# Patient Record
Sex: FEMALE | Race: ASIAN | NOT HISPANIC OR LATINO | Employment: UNEMPLOYED | URBAN - METROPOLITAN AREA
[De-identification: names, ages, dates, MRNs, and addresses within clinical notes are randomized per-mention and may not be internally consistent; named-entity substitution may affect disease eponyms.]

---

## 2017-02-24 ENCOUNTER — OFFICE VISIT - HEALTHEAST (OUTPATIENT)
Dept: FAMILY MEDICINE | Facility: CLINIC | Age: 27
End: 2017-02-24

## 2017-02-24 DIAGNOSIS — L30.9 ECZEMA: ICD-10-CM

## 2017-02-24 ASSESSMENT — MIFFLIN-ST. JEOR: SCORE: 1135.82

## 2017-05-17 ENCOUNTER — COMMUNICATION - HEALTHEAST (OUTPATIENT)
Dept: FAMILY MEDICINE | Facility: CLINIC | Age: 27
End: 2017-05-17

## 2017-12-08 ENCOUNTER — OFFICE VISIT - HEALTHEAST (OUTPATIENT)
Dept: FAMILY MEDICINE | Facility: CLINIC | Age: 27
End: 2017-12-08

## 2017-12-08 DIAGNOSIS — Z34.90 PREGNANCY: ICD-10-CM

## 2017-12-08 ASSESSMENT — MIFFLIN-ST. JEOR: SCORE: 1158.5

## 2017-12-15 ENCOUNTER — HOSPITAL ENCOUNTER (OUTPATIENT)
Dept: ULTRASOUND IMAGING | Facility: HOSPITAL | Age: 27
Discharge: HOME | End: 2017-12-15
Attending: FAMILY MEDICINE

## 2017-12-15 DIAGNOSIS — Z34.90 PREGNANCY: ICD-10-CM

## 2017-12-22 ENCOUNTER — PRENATAL OFFICE VISIT - HEALTHEAST (OUTPATIENT)
Dept: FAMILY MEDICINE | Facility: CLINIC | Age: 27
End: 2017-12-22

## 2017-12-22 DIAGNOSIS — Z34.02 PRIMIGRAVIDA IN SECOND TRIMESTER: ICD-10-CM

## 2017-12-22 DIAGNOSIS — Z12.4 PAP SMEAR FOR CERVICAL CANCER SCREENING: ICD-10-CM

## 2017-12-22 LAB — HIV 1+2 AB+HIV1 P24 AG SERPL QL IA: NEGATIVE

## 2017-12-22 ASSESSMENT — MIFFLIN-ST. JEOR: SCORE: 1181.18

## 2017-12-23 LAB
HBV SURFACE AG SERPL QL IA: NEGATIVE
SYPHILIS RPR SCREEN - HISTORICAL: NORMAL

## 2017-12-27 ENCOUNTER — COMMUNICATION - HEALTHEAST (OUTPATIENT)
Dept: FAMILY MEDICINE | Facility: CLINIC | Age: 27
End: 2017-12-27

## 2017-12-27 LAB
BKR LAB AP ABNORMAL BLEEDING: NO
BKR LAB AP BIRTH CONTROL/HORMONES: NORMAL
BKR LAB AP CERVICAL APPEARANCE: YES
BKR LAB AP GYN ADEQUACY: NORMAL
BKR LAB AP GYN INTERPRETATION: NORMAL
BKR LAB AP HPV REFLEX: NORMAL
BKR LAB AP LMP: NORMAL
BKR LAB AP PATIENT STATUS: NORMAL
BKR LAB AP PREVIOUS ABNORMAL: NORMAL
BKR LAB AP PREVIOUS NORMAL: NORMAL
HIGH RISK?: NO
PATH REPORT.COMMENTS IMP SPEC: NORMAL
RESULT FLAG (HE HISTORICAL CONVERSION): NORMAL

## 2017-12-28 ENCOUNTER — COMMUNICATION - HEALTHEAST (OUTPATIENT)
Dept: SCHEDULING | Facility: CLINIC | Age: 27
End: 2017-12-28

## 2017-12-28 ENCOUNTER — AMBULATORY - HEALTHEAST (OUTPATIENT)
Dept: NURSING | Facility: CLINIC | Age: 27
End: 2017-12-28

## 2017-12-28 DIAGNOSIS — O98.219 GONORRHEA AFFECTING PREGNANCY: ICD-10-CM

## 2019-10-04 ENCOUNTER — OFFICE VISIT - HEALTHEAST (OUTPATIENT)
Dept: FAMILY MEDICINE | Facility: CLINIC | Age: 29
End: 2019-10-04

## 2019-10-04 DIAGNOSIS — Q67.5 CONGENITAL MUSCULOSKELETAL DEFORMITY OF SPINE: ICD-10-CM

## 2019-10-04 ASSESSMENT — MIFFLIN-ST. JEOR: SCORE: 1215.48

## 2019-10-04 ASSESSMENT — PATIENT HEALTH QUESTIONNAIRE - PHQ9: SUM OF ALL RESPONSES TO PHQ QUESTIONS 1-9: 0

## 2019-10-11 ENCOUNTER — RECORDS - HEALTHEAST (OUTPATIENT)
Dept: ADMINISTRATIVE | Facility: OTHER | Age: 29
End: 2019-10-11

## 2019-12-16 ENCOUNTER — COMMUNICATION - HEALTHEAST (OUTPATIENT)
Dept: SCHEDULING | Facility: CLINIC | Age: 29
End: 2019-12-16

## 2019-12-18 ENCOUNTER — OFFICE VISIT - HEALTHEAST (OUTPATIENT)
Dept: FAMILY MEDICINE | Facility: CLINIC | Age: 29
End: 2019-12-18

## 2019-12-18 DIAGNOSIS — N91.2 AMENORRHEA: ICD-10-CM

## 2019-12-18 DIAGNOSIS — Z86.19 HISTORY OF GONORRHEA: ICD-10-CM

## 2019-12-18 DIAGNOSIS — Z34.90 PREGNANCY, UNSPECIFIED GESTATIONAL AGE: ICD-10-CM

## 2019-12-18 LAB — HCG UR QL: POSITIVE

## 2019-12-18 ASSESSMENT — MIFFLIN-ST. JEOR: SCORE: 1210.09

## 2019-12-20 LAB
C TRACH DNA SPEC QL PROBE+SIG AMP: NEGATIVE
N GONORRHOEA DNA SPEC QL NAA+PROBE: NEGATIVE

## 2020-03-04 ENCOUNTER — COMMUNICATION - HEALTHEAST (OUTPATIENT)
Dept: FAMILY MEDICINE | Facility: CLINIC | Age: 30
End: 2020-03-04

## 2020-04-01 ENCOUNTER — OFFICE VISIT - HEALTHEAST (OUTPATIENT)
Dept: FAMILY MEDICINE | Facility: CLINIC | Age: 30
End: 2020-04-01

## 2020-04-01 DIAGNOSIS — Z30.09 BIRTH CONTROL COUNSELING: ICD-10-CM

## 2020-04-01 DIAGNOSIS — F32.3 MAJOR DEPRESSIVE DISORDER, SINGLE EPISODE, SEVERE WITH PSYCHOTIC FEATURES (H): ICD-10-CM

## 2020-04-01 LAB — HCG UR QL: NEGATIVE

## 2020-04-01 RX ORDER — NORGESTIMATE AND ETHINYL ESTRADIOL 0.25-0.035
1 KIT ORAL DAILY
Qty: 3 PACKAGE | Refills: 3 | Status: SHIPPED | OUTPATIENT
Start: 2020-04-01 | End: 2023-08-03

## 2020-04-01 ASSESSMENT — MIFFLIN-ST. JEOR: SCORE: 1186.28

## 2020-04-01 ASSESSMENT — PATIENT HEALTH QUESTIONNAIRE - PHQ9: SUM OF ALL RESPONSES TO PHQ QUESTIONS 1-9: 0

## 2020-04-09 ENCOUNTER — OFFICE VISIT - HEALTHEAST (OUTPATIENT)
Dept: FAMILY MEDICINE | Facility: CLINIC | Age: 30
End: 2020-04-09

## 2020-04-09 DIAGNOSIS — Z30.016 ENCOUNTER FOR INITIAL PRESCRIPTION OF TRANSDERMAL PATCH HORMONAL CONTRACEPTIVE DEVICE: ICD-10-CM

## 2021-04-21 ENCOUNTER — COMMUNICATION - HEALTHEAST (OUTPATIENT)
Dept: FAMILY MEDICINE | Facility: CLINIC | Age: 31
End: 2021-04-21

## 2021-04-21 DIAGNOSIS — Z30.016 ENCOUNTER FOR INITIAL PRESCRIPTION OF TRANSDERMAL PATCH HORMONAL CONTRACEPTIVE DEVICE: ICD-10-CM

## 2021-05-26 ASSESSMENT — PATIENT HEALTH QUESTIONNAIRE - PHQ9
SUM OF ALL RESPONSES TO PHQ QUESTIONS 1-9: 0
SUM OF ALL RESPONSES TO PHQ QUESTIONS 1-9: 0

## 2021-05-30 VITALS — WEIGHT: 114 LBS | BODY MASS INDEX: 23.93 KG/M2 | HEIGHT: 58 IN

## 2021-05-31 VITALS — WEIGHT: 119 LBS | BODY MASS INDEX: 24.98 KG/M2 | HEIGHT: 58 IN

## 2021-05-31 VITALS — BODY MASS INDEX: 26.03 KG/M2 | HEIGHT: 58 IN | WEIGHT: 124 LBS

## 2021-06-02 NOTE — PATIENT INSTRUCTIONS - HE
Referral given for Orthotics/Prosthetics evaluation at [facility]. Please set this up with specialty scheduling.

## 2021-06-02 NOTE — PROGRESS NOTES
"ASSESSMENT AND PLAN:  1. Congenital Spinal Deformity  Velcro is frayed 2 spots on the brace she will need to be evaluated again for a new brace this point appears to be quite old.  - Ambulatory referral for Orthotics / Prosthetics - Madhuri            Orders Placed This Encounter   Procedures     Ambulatory referral for Orthotics / Prosthetics - Madhuri     Referral Priority:   Routine     Referral Type:   Orthotics     Referral Reason:   Evaluation and Treatment     Referral Location:   [facility] ORTHOTICS PROSTHETICS     Requested Specialty:   Orthotics     Number of Visits Requested:   1     There are no discontinued medications.    No follow-ups on file.    CHIEF COMPLAINT:  Chief Complaint   Patient presents with     Broken brace       HISTORY OF PRESENT ILLNESS:  Starr is a 28 y.o. female with congenital spinal deformity, progressive idiopathic adolescent scoliosis, status post T2-L3 spinal fusion complicated by incomplete spinal cord injury, status post T7-T8 resection, major depression, vitamin D deficiency, and history of malaria, who presents to the clinic today for a new leg brace. Starr is present with a Tabby . She previously followed with Dr. Pascal and then at [facility] for her pregnancies. The patient has worn a left ankle brace for 4 years, and now the Velcro has worn off.     REVIEW OF SYSTEMS:   Extremities: Left leg ankle brace has worn down.   All other systems are negative.    PFSH:  She lives in [place]. Reviewed as below.     TOBACCO USE:  Social History     Tobacco Use   Smoking Status Never Smoker   Smokeless Tobacco Never Used       VITALS:  Vitals:    10/04/19 1336   BP: 90/64   Pulse: 76   Resp: 18   Temp: 98.3  F (36.8  C)   TempSrc: Oral   Weight: 127 lb 3 oz (57.7 kg)   Height: 4' 11.5\" (1.511 m)     Wt Readings from Last 3 Encounters:   10/04/19 127 lb 3 oz (57.7 kg)   12/22/17 124 lb (56.2 kg)   12/08/17 119 lb (54 kg)     Body mass index is 25.26 kg/m .      PHYSICAL " EXAM:  General: Alert, cooperative, no distress, appears stated age  Head: Normocephalic, without obvious abnormality, atraumatic  Eyes: PERRL, conjunctiva/cornea clear, EOM's intact  Extremities: Wearing down of left leg brace at the lateral malleoulus and just below the left knee  Neurologic:  A & O x 3.  No tremor, no focal findings.      DATA REVIEWED:  Additional History from Old Records Summarized (2): Reviewed Dr. Dick's 12/22/2017 note regarding new OB visit and past medical history.  Decision to Obtain Records (1): none  Radiology Tests Summarized or Ordered (1): none  Labs Reviewed or Ordered (1): none  Medicine Test Summarized or Ordered (1): none  Independent Review of EKG or X-RAY(2 each): none      Rain MARTINEZ, am scribing for and in the presence of, Dr. Parikh.    I, Dr. Parikh, personally performed the services described in this documentation, as scribed by Rain Massey in my presence, and it is both accurate and complete.      MEDICATIONS:  Current Outpatient Medications   Medication Sig Dispense Refill     ferrous sulfate 325 (65 FE) MG tablet Take 1 tablet (325 mg total) by mouth daily. 30 tablet 6     triamcinolone (KENALOG) 0.1 % ointment Apply topically 2 (two) times a day. 30 g 0     No current facility-administered medications for this visit.        Total Data Points: 2    Please note that this clinical encounter uses voice recognition software, there may be typographical errors present

## 2021-06-03 VITALS
WEIGHT: 127.19 LBS | BODY MASS INDEX: 24.97 KG/M2 | HEIGHT: 60 IN | DIASTOLIC BLOOD PRESSURE: 64 MMHG | HEART RATE: 76 BPM | RESPIRATION RATE: 18 BRPM | SYSTOLIC BLOOD PRESSURE: 90 MMHG | TEMPERATURE: 98.3 F

## 2021-06-03 VITALS
SYSTOLIC BLOOD PRESSURE: 108 MMHG | TEMPERATURE: 98.5 F | DIASTOLIC BLOOD PRESSURE: 74 MMHG | BODY MASS INDEX: 24.74 KG/M2 | OXYGEN SATURATION: 99 % | WEIGHT: 126 LBS | HEIGHT: 60 IN | RESPIRATION RATE: 16 BRPM | HEART RATE: 82 BPM

## 2021-06-04 VITALS
RESPIRATION RATE: 16 BRPM | TEMPERATURE: 97.9 F | HEART RATE: 83 BPM | DIASTOLIC BLOOD PRESSURE: 68 MMHG | WEIGHT: 120.75 LBS | SYSTOLIC BLOOD PRESSURE: 102 MMHG | OXYGEN SATURATION: 98 % | HEIGHT: 60 IN | BODY MASS INDEX: 23.71 KG/M2

## 2021-06-04 NOTE — TELEPHONE ENCOUNTER
New Appointment Needed  What is the reason for the visit:    Pregnancy Confirmation Appt Needed  When was the first day of your last menstrual cycle?: 11/05  Have you done a home pregnancy test?: Yes: 3.    Provider Preference: Any available  How soon do you need to be seen?: as soon as able.   Waitlist offered?: No  Okay to leave a detailed message:  Yes

## 2021-06-04 NOTE — PROGRESS NOTES
"SUBJECTIVE  Winancy Sommers is a 29 y.o. female here for:    Has 1 son- born [facility] in [facility] via normal vaginal delivery.   Of note- she has history of severe scoliosis s/p surgical correction.  LMP 19. ARLETH 8, making her 6w4d today.  [facility] and she was reportedly 8 weeks along  She desires termination of pregnancy  She feels unhealthy and worries about her medical conditions, scoliosis, pain.   She has not told anyone about the pregnancy  Denies any safety concerns  She lives with her grandparents who are supportive    ROS  Complete 10 point review of systems negative except as noted above in HPI    Reviewed Past Medical History, Medications, Family History and Social History in Epic and up to date with no new changes.    OBJECTIVE  /74   Pulse 82   Temp 98.5  F (36.9  C) (Oral)   Resp 16   Ht 4' 11.5\" (1.511 m)   Wt 126 lb (57.2 kg)   LMP 2019   SpO2 99%   Breastfeeding No   BMI 25.02 kg/m       General: Cooperative, pleasant, in no acute distress  HEENT: NCAT, sclera clear  Resp: No respiratory distress.   Psych: Appropriately tearful, flat affect, denies SI    LABS & IMAGES   Results for orders placed or performed in visit on 19   Pregnancy, Urine   Result Value Ref Range    Pregnancy Test, Urine Positive (!) Negative         ASSESSMENT/PLAN:   Starr was seen today for confirmation.    Diagnoses and all orders for this visit:    Pregnancy, unspecified gestational age:  at 8 weeks based on US yesterday at outside clinic- she desires termination of pregnancy. Discussed all options and offered support. She has history of depression- not on medications recently and she has very flat affect today. Denies SI. She lives with her grandparents. Discussed termination procedure- patient was walked down to specialty  who will assist with setting up appointment. I will follow-up with patient and her son's next clinic visit- she " declined scheduling follow-up for herself at this time.  -     Pregnancy, Urine    History of gonorrhea: Treated and had test of cure. Will recheck today- asymptomatic screening.  -     Chlamydia trachomatis & Neisseria gonorrhoeae, Amplified Detection      Spent 35 min face to face with patient with more the 50% spent in counseling, reviewing chart, and coordination of care and discussing problems listed above.       Visit was completed along with Tabby     Options for treatment and follow-up care were reviewed with the patient. Wieber Thoo and/or guardian was engaged and actively involved in the decision making process. Wieber Thoo and/or guardian verbalized understanding of the options discussed and was satisfied with the final plan.      Shivani Dick MD

## 2021-06-04 NOTE — TELEPHONE ENCOUNTER
Who is calling:  Patient  Reason for Call:  Returning call   Date of last appointment with primary care:   Okay to leave a detailed message: Yes

## 2021-06-06 NOTE — TELEPHONE ENCOUNTER
CHW outreached to the Billing Department regarding patient's outstanding balance. December 2019 patient had active straight Medical Assistance. Vanita from the Billing Department updated the insurance for the claim and states the patient can disregard the bill.      PMI#: [id number]     Major Programs  This subscriber has eligibility for MA: Medical Assistance.  Elig Type AA: Parent of a dependent child  Eligibility Begin Date: 05/01/2019  Eligibility End Date: --/--/----  This subscriber is eligible for the following service types: Medical Care ,  Chiropractic ,  Dental Care ,  Hospital ,  Hospital - Inpatient ,  Hospital - Outpatient ,  Emergency Services ,  Pharmacy ,  Professional (Physician) Visit - Office ,  Vision (Optometry) ,  Mental Health ,  Urgent Care  Prepaid Health Plan  None      CHW followed up with Starr and informed her she can disregard the bill. Starr reports she has no other concerns.

## 2021-06-06 NOTE — TELEPHONE ENCOUNTER
Pt would like an appt with the SW for help asking insurance to back-date for appt on 12/18/19 (ins not active that month).

## 2021-06-07 NOTE — PROGRESS NOTES
"No problem-specific Assessment & Plan notes found for this encounter.    Starr Sommers is a 29 y.o. female who is being evaluated via a billable telephone visit.      The patient has been notified of following:     \"This telephone visit will be conducted via a call between you and your physician/provider. We have found that certain health care needs can be provided without the need for a physical exam.  This service lets us provide the care you need with a short phone conversation.  If a prescription is necessary we can send it directly to your pharmacy.  If lab work is needed we can place an order for that and you can then stop by our lab to have the test done at a later time.    Telephone visits are billed at different rates depending on your insurance coverage. During this emergency period, for some insurers they may be billed the same as an in-person visit.  Please reach out to your insurance provider with any questions.    If during the course of the call the physician/provider feels a telephone visit is not appropriate, you will not be charged for this service.\"    Patient has given verbal consent to a Telephone visit? Yes    Patient would like to receive their AVS by AVS Preference: Mail a copy.    Starr Sommers complains of    Chief Complaint   Patient presents with     Contraception     Patient is currently taking birth control pill and wouldl to discuss the Nexplanon.        I have reviewed and updated the patient's Past Medical History, Social History, Family History and Medication List.    ALLERGIES  Patient has no known allergies.    Additional provider notes: insert own note template here     No tobacco   No blood Clots  No Liver issues      Assessment/Plan:  1. Encounter for initial prescription of transdermal patch hormonal contraceptive device  Patient desires birth control  Because of covid-19 pandemic  Putting off all nonessential procedures  She is interested down the line and possibly having a " Nexplanon procedure  For the time being she will use Ortho Evra equivalent  She will use 1 patch weekly consecutive for 3 weeks then on the fourth week she will remove the patch and keep the soft during this week she was encouraged to start this 2 days after her next cycle  She was encouraged to call for starting the prescription so she can get specific instructions on which days to put the patch on and take the patch off      - norelgestromin-ethinyl estradiol (ORTHO EVRA) 150-35 mcg/24 hr; Place 1 patch on the skin every 7 days. For 3 consecutive weeks then remove and do not replace patch for 1 week then restart cycle  Dispense: 9 patch; Refill: 3        Phone call duration:   28  Minutes   10:07 AM to 10:35 AM     Gurvinder Atkinson MD

## 2021-06-07 NOTE — PROGRESS NOTES
"Starr Sommers is a 29 y.o. female here for birth control counseling    ASSESSMENT/PLAN:   Starr was seen today for contraception.    Diagnoses and all orders for this visit:    Birth control counseling  Discussed all options for birth control with patient, she wanted OCPs. Pregnancy test negative, has not had sexual intercourse in 2 weeks.   -     Pregnancy, Urine  -     norgestimate-ethinyl estradioL (ORTHO-CYCLEN) 0.25-35 mg-mcg per tablet; Take 1 tablet by mouth daily.  -  Use back up contraception for first week      Major Depression, Single Episode With Psychotic Features  -     PHQ9 Depression Screen, score 0        Return in about 1 month (around 5/1/2020) for birth control only if she has side effects.   RTC for annual physical when able    ======================================================    SUBJECTIVE  Starr Sommers is a 29 y.o. female here for birth control.     She is interested in the pill.   She has been on Depakote in the past, had irregular periods and fatigue and headaches.    Discussed all her options including IUD, Nexplanon, OCPs, patch, NuvaRing, condoms.  Patient still wants to try OCPs.  She is a non-smoker, not around anyone who smokes, no personal or family history of blood clots or bleeding disorders.    Discussed with patient reasons to call clinic, including side effects.  She needs to take the pill at the same time every single day, if she is unable to do that then she should call clinic for alternative forms of birth control.      ROS  Complete 10 point review of systems negative except as noted above in HPI    Reviewed Past Medical History, Medications, Family History and Social History in Epic and up to date with no new changes.    OBJECTIVE  /68   Pulse 83   Temp 97.9  F (36.6  C) (Oral)   Resp 16   Ht 4' 11.5\" (1.511 m)   Wt 120 lb 12 oz (54.8 kg)   LMP 03/28/2020 (Within Days)   SpO2 98%   BMI 23.98 kg/m       General: Cooperative, pleasant, in no acute " distress  HEENT: PERRL, EOMI.   Resp: No respiratory distress.   Skin: warm, well perfused.   Psych: No suicidal or homicidal ideations, no self-harm.  Normal affect.    LABS & IMAGES   Results for orders placed or performed in visit on 04/01/20   Pregnancy, Urine   Result Value Ref Range    Pregnancy Test, Urine Negative Negative         ======================================================    Visit was completed along with in person Tabby     Options for treatment and follow-up care were reviewed with the patient. Wieber Thoo and/or guardian was engaged and actively involved in the decision making process. Wieber Thoo and/or guardian verbalized understanding of the options discussed and was satisfied with the final plan.      Isabel Mccall MD

## 2021-06-08 ENCOUNTER — RECORDS - HEALTHEAST (OUTPATIENT)
Dept: ADMINISTRATIVE | Facility: OTHER | Age: 31
End: 2021-06-08

## 2021-06-08 DIAGNOSIS — Z30.016 ENCOUNTER FOR INITIAL PRESCRIPTION OF TRANSDERMAL PATCH HORMONAL CONTRACEPTIVE DEVICE: ICD-10-CM

## 2021-06-08 RX ORDER — NORELGESTROMIN AND ETHINYL ESTRADIOL 150; 35 UG/D; UG/D
PATCH TRANSDERMAL
Qty: 3 PATCH | Refills: 3 | Status: SHIPPED | OUTPATIENT
Start: 2021-06-08 | End: 2021-10-19

## 2021-06-09 NOTE — PROGRESS NOTES
"  Chief Complaint   Patient presents with     Rash     bilateral arms         HPI:   Starr Sommers is a 26 y.o. female with  c/o itchy rash for about the last month..  Located on both elbows and right index finger.  Started on left elbow  No medication tried.  No drainage.  No one else at home has a rash  No medication.  Arrived US 2007.    Also requests help scheduling to get her leg brace repaired    ROS:  A 12 point comprehensive review of systems was negative except as noted.     Medications:  No current outpatient prescriptions on file prior to visit.     No current facility-administered medications on file prior to visit.          Social History:  Social History   Substance Use Topics     Smoking status: Never Smoker     Smokeless tobacco: Never Used     Alcohol use No         Physical Exam:   Vitals:    02/24/17 1025   BP: 112/68   Patient Site: Right Arm   Patient Position: Sitting   Cuff Size: Adult Regular   Pulse: 72   Resp: 16   Temp: 98  F (36.7  C)   TempSrc: Oral   Weight: 114 lb (51.7 kg)   Height: 4' 10.25\" (1.48 m)       GENERAL:   Alert. Oriented.  EYES: Clear  HENT:  Ears: R TM pearly gray. Normal landmarks. L TM pearly gray.  Normal landmarks  Nose: Clear.  Sinuses: Nontender.  Oropharynx:  No erythema. No exudate.  NECK: Supple. No adenopathy.  LUNGS: Clear to ascultation.  No crackles.  No wheezing  HEART: RRR  SKIN:  Papules and plaques, with slight erythema and scaling on elbows and side of right index finger.        Assessment/Plan:    1. Eczema  triamcinolone (KENALOG) 0.1 % ointment    DISCONTINUED: triamcinolone (KENALOG) 0.1 % ointment      Good moisturizing lotion.  Recheck if not improving.    Sent to specialty to assist in scheduling repair      The following portions of the patient's history were reviewed and updated as appropriate: allergies, current medications, past family history, past medical history, past social history, past surgical history and problem " list.    Jenifer Aguilar MD      2/24/2017

## 2021-06-14 NOTE — PROGRESS NOTES
New OB Clinic Note - 2017   Visit was completed along with Tabby .   SUBJECTIVE:  Starr Sommers is a 27 y.o.  female @ 17w6d who is here for new OB visit.   Dates based on LMP >10 days different from ultrasound, so will use dating based on Ultrasound at 16 weeks gave EDC of 18  She is happy about the pregnancy.     Current Concerns: none  She is moving to [place], [place] next week to live with family. Her grandparents, who she lives with here, will be joining her.   She has had not had nausea and vomiting.   She denies bleeding, cramping. No loss of fluid. She denies HA.   Denies dysuria or hematuria.   Denies constipation.  OB History:  Patient is . Prior Pregnancies:  She does not have a history of  birth before 37 weeks with a prater gestation.  She does not have a history of preeclampsia in prior pregnancy.   She does not have a history of recurrent (>2) pregnancy losses.  She does not have a history of Down's syndrome, sickle cell anemia or other genetic disorder affecting a child in her family.  She does have a history of major depression or postpartum depression- reviewed chart notes. She denies today.  She does not have a history of STI's including Chlamydia, gonorrhea, Hep B virus, syphilis, HPV, or genital herpes.   Social Hx:   She has good support from her family (lives with grandparents) and father of baby Mayank (does not know about pregnancy)   She stays at home.  She has not used tobacco, has not used EtOH and has not used illicit drugs.  Current Medications: prenatal vitamins, does not need refill  Medical History:  Progressive idiopathic adolescent scoliosis  Hx of depression    Surgical History:  Spine surgery 2015: T2-L3 spinal fusion for scoliosis complicated by incomplete spinal cord injury, now resolved. T7-T8 resection.    Family History   Problem Relation Age of Onset     No Medical Problems Mother      No Medical Problems Father      Medications: prenatal  "vitamin  OBJECTIVE:  Vitals:    17 1006   BP: 98/60   Patient Site: Right Arm   Patient Position: Sitting   Cuff Size: Adult Regular   Pulse: 96   Resp: 16   Temp: 98.1  F (36.7  C)   TempSrc: Oral   Weight: 124 lb (56.2 kg)   Height: 4' 10.25\" (1.48 m)     Gen: Awake, alert, in no acute distress  HEENT: oral mucosa is moist and pink, dentition is adequate   Neck: Thyroid is non-enlarged, without nodules or tenderness  CV: RRR with normal S1 and S2. No murmurs appreciated.  Resp: Lungs are clear to auscultation bilaterally without crackles or wheezing.  Abd: non-tender, non-distended. Bowel sounds present.   Pelvic Exam: Vagina and vulva are normal; no discharge is noted. Cervix normal without lesions. Uterus anteverted and mobile, normal in size and shape without tenderness. Adnexa normal in size without masses or tenderness. She is due for a pap smear today.  Lower extremities: No edema  Psych: flat affect  Neuro: No focal deficits  's  Results for orders placed or performed in visit on 17   Urinalysis Macroscopic   Result Value Ref Range    Color, UA Yellow Colorless, Yellow, Straw, Light Yellow    Clarity, UA Clear Clear    Glucose, UA Negative Negative    Bilirubin, UA Negative Negative    Ketones, UA Negative Negative    Specific Gravity, UA 1.020 1.005 - 1.030    Blood, UA Negative Negative    pH, UA 7.0 5.0 - 8.0    Protein, UA Negative Negative mg/dL    Urobilinogen, UA 0.2 E.U./dL 0.2 E.U./dL, 1.0 E.U./dL    Nitrite, UA Negative Negative    Leukocytes, UA Trace (!) Negative     ASSESSMENT/PLAN:  Starr Sommers is a 27 y.o.  at 17w6d weeks by 16 week ultrasound. Estimated Date of Delivery: 18.   1. Discussed recommended weight gain, healthy eating habits, exercise, and warning signs for miscarriage and  labor (bleeding, cramping).   2. Continue prenatal vitamins.   3. Counseled on smoking cessation (if indicated) and abstinence from alcohol, opioids, benzodiazepines and " non-medically necessary medications  4. Pelvic exam including GC, Chlamydia and PAP (if >21yrs) was completed.  5. Prenatal labs completed - prenatal profile, UA/UC, HIV   6. Reviewed eligibility for low-dose aspirin therapy for prevention of preeclampsia (preeclampsia in prior pregnancy, pre-existing HTN or DM, or multiple other risk factors including  delivery, chronic HTN, DM, obesity, low socioeconomic status, non- ethnicity). Patient is not a candidate for this.   7. Reviewed eligibility for 17-hydroxyprogesterone therapy for prevention of  birth (prior prater delivery before 37 weeks). Patient is not a candidate for this.   8. Patient declined quad screening.  9. Hx of depression: I did have some concern about her overall mood and affect today. She expressed she is happy about pregnancy and denies any depression or mood concerns. She is currently not on any SSRI or other medications for mood and not seeing counselor. Will need to monitor closely throughout pregnancy.  10. Patient will be moving to [place], [place] next week. Gave her dating information/AVS today to bring with, and encouraged her to establish care within a few weeks.  Starr was seen today for initial prenatal visit.    Diagnoses and all orders for this visit:    Primigravida in second trimester  -     ABO/RH Typing (OP order)  -     Hepatitis B Surface antigen (HBsAG)  -     HIV Antigen/Antibody Screening Cascade  -     HM1(CBC and Differential)  -     HML Antibody Screen  -     RPR  -     Rubella Immune Status (IgG)  -     Urinalysis Macroscopic  -     Culture, Urine  -     Chlamydia/gonorrhoeae CERVIX  -     HM1 (CBC with Diff)    Pap smear for cervical cancer screening  -     Gynecologic Cytology (PAP Smear)    Shivani Dick MD    Options for treatment and follow-up care were reviewed with the patient and/or guardian. Starr Thoo and/or guardian engaged in the decision making process and verbalized understanding of the  options discussed and agreed with the final plan.

## 2021-06-14 NOTE — PROGRESS NOTES
"Chief Complaint   Patient presents with     Pregnancy Confirmation       HPI:  Starr Sommers is a 27 y.o. female  with   presents requesting pregnancy test.  Patient's last menstrual period was 10/01/2017 (exact date).   Normal: yes  Contraception: none  Home Pregnancy Test:  10/10/17--positive  No obstetric history on file.  Last Pregnancy: none  Symptoms: nausea since October    Tobacco use: none  ETOH use:  none    Planned: no    MEDICATIONS:  Current Outpatient Prescriptions on File Prior to Visit   Medication Sig     triamcinolone (KENALOG) 0.1 % ointment Apply topically 2 (two) times a day.     No current facility-administered medications on file prior to visit.          ALLERGIES:  No Known Allergies    SOCIAL HISTORY:  History   Smoking Status     Never Smoker   Smokeless Tobacco     Never Used         EXAM:  Vitals:    12/08/17 0825   BP: 98/64   Pulse: 64   Resp: 20   Temp: 98.3  F (36.8  C)   TempSrc: Oral   Weight: 119 lb (54 kg)   Height: 4' 10.25\" (1.48 m)       GEN:  NAD  ABD:  FUNDUS: midway to umbilicus    FHT's by moi: 160    LABS:  Results for orders placed or performed in visit on 12/08/17   Pregnancy, Urine   Result Value Ref Range    Pregnancy Test, Urine Positive (!) Negative       ASSESSMENT/PLAN:    1. Amenorrhea  Pregnancy, Urine      Dating by LMP:  EGA: 9+5 weeks    EDC: 7/8/17--not consistent with size    Start prenatal vitamins.  Healthy diet  Small, frequent meals if nauseated.  No medications other than tylenol unless okayed by doctor  Notify clinic if bleeding or pain.  Set up 1st OB visit.     U/s for dating.    Patient has some past history of spine problem with large scar on back.  I am unsure what that was.  Patient was not planning pregnancy, is not  and hasn't had contact with father of the baby for some time.  She denies any physical abuse.    The following portions of the patient's history were reviewed and updated as appropriate: allergies, current " medications, past family history, past medical history, past social history, past surgical history and problem list.         Jenifer Aguilar MD   12/8/2017

## 2021-06-20 NOTE — LETTER
Letter by Gurvinder Atkinson MD at      Author: Gurvinder Atkinson MD Service: -- Author Type: --    Filed:  Encounter Date: 4/9/2020 Status: (Other)                    My Depression Action Plan  Name: Starr Sommers   Date of Birth 1990  Date: 4/9/2020    My Doctor: Rossi Pascal MD   My Clinic: [facility]   [address] [place] [id number]  [phone]          GREEN    ZONE   Good Control    What it looks like:     Things are going generally well. You have normal ups and downs. You may even feel depressed from time to time, but bad moods usually last less than a day.   What you need to do:  1. Continue to care for yourself (see self care plan)  2. Check your depression survival kit and update it as needed  3. Follow your physicians recommendations including any medication.  4. Do not stop taking medication unless you consult with your physician first.           YELLOW         ZONE Getting Worse    What it looks like:     Depression is starting to interfere with your life.     It may be hard to get out of bed; you may be starting to isolate yourself from others.    Symptoms of depression are starting to last most all day and this has happened for several days.     You may have suicidal thoughts but they are not constant.   What you need to do:     1. Call your care team. Your response to treatment will improve if you keep your care team informed of your progress. Yellow periods are signs an adjustment may need to be made.     2. Continue your self-care.  Just get dressed and ready for the day.  Don't give yourself time to talk yourself out of it.    3. Talk to someone in your support network.    4. Open up your depression Depression Self-Care Plan / Wellness kit.           RED    ZONE Medical Alert - Get Help    What it looks like:     Depression is seriously interfering with your life.     You may experience these or other symptoms: You cant get out of bed most days, cant work  or engage in other necessary activities, you have trouble taking care of basic hygiene, or basic responsibilities, thoughts of suicide or death that will not go away, self-injurious behavior.     What you need to do:  1. Call your care team and request a same-day appointment. If they are not available (weekends or after hours) call your local crisis line, emergency room or 911.            Self-Care Plan / Wellness Kit    Self-Care for Depression  Heres the deal. Your body and mind are really not as separate as most people think.  What you do and think affects how you feel and how you feel influences what you do and think. This means if you do things that people who feel good do, it will help you feel better.  Sometimes this is all it takes.  There is also a place for medication and therapy depending on how severe your depression is, so be sure to consult with your medical provider and/ or Behavioral Health Consultant if your symptoms are worsening or not improving.     In order to better manage my stress, I will:    Exercise  Get some form of exercise, every day. This will help reduce pain and release endorphins, the feel good chemicals in your brain. This is almost as good as taking antidepressants!  This is not the same as joining a gym and then never going! (they count on that by the way?) It can be as simple as just going for a walk or doing some gardening, anything that will get you moving.      Hygiene   Maintain good hygiene (get out of bed in the morning, make your bed, brush your teeth, take a shower, and get dressed like you were going to work, even if you are unemployed).  If your clothes don't fit try to get ones that do.    Diet  Strive to eat foods that are good for me, drink plenty of water, and avoid excessive sugar, caffeine, alcohol, and other mood-altering substances.  Some foods that are helpful in depression are: complex carbohydrates, B vitamins, flaxseed, fish or fish oil, fresh fruits and  vegetables.    Psychotherapy  Agree to participate in Individual Therapy (if recommended).    Medication  If prescribed medications, I agree to take them.  Missing doses can result in serious side effects.  I understand that drinking alcohol, or other illicit drug use, may cause potential side effects.  I will not stop my medication abruptly without first discussing it with my provider.    Staying Connected With Others  Stay in touch with my friends, family members, and my primary care provider/team.    Use your imagination  Be creative.  We all have a creative side; it doesnt matter if its oil painting, sand castles, or mud pies! This will also kick up the endorphins.    Witness Beauty  (AKA stop and smell the roses) Take a look outside, even in mid-winter. Notice colors, textures. Watch the squirrels and birds.     Service to others  Be of service to others.  There is always someone else in need.  By helping others we can get out of ourselves and remember the really important things.  This also provides opportunities for practicing all the other parts of the program.    Humor  Laugh and be silly!  Adjust your TV habits for less news and crime-drama and more comedy.    Control your stress  Try breathing deep, massage therapy, biofeedback, and meditation. Find time to relax each day.     Crisis Text Line  http://www.crisistextline.org    The Crisis Text Line serves anyone, in any type of crisis, providing access to free, 24/7 support and information via the medium people already use and trust:    Here's how it works:  1.  Text 836-292 from anywhere in the [place], anytime, about any type of crisis.  2.  A live, trained Crisis Counselor receives the text and responds quickly.  3.  The volunteer Crisis Counselor will help you move from a 'hot moment to a cool moment'.  My support system    Clinic Contact:  Phone number:    Contact 1:  Phone number:    Contact 2:  Phone number:    Hindu/:  Phone  number:    Therapist:  Phone number:    [facility] crisis center:    Phone number:    Other community support:  Phone number:

## 2021-07-14 PROBLEM — O48.1 PROLONGED PREGNANCY: Status: RESOLVED | Noted: 2018-05-27 | Resolved: 2020-04-09

## 2021-07-14 PROBLEM — Z28.39 RUBELLA NON-IMMUNE STATUS, ANTEPARTUM: Status: RESOLVED | Noted: 2017-12-27 | Resolved: 2020-04-01

## 2021-07-14 PROBLEM — O98.212 GONORRHEA AFFECTING PREGNANCY IN SECOND TRIMESTER: Status: RESOLVED | Noted: 2017-12-27 | Resolved: 2020-04-01

## 2021-07-14 PROBLEM — O09.899 RUBELLA NON-IMMUNE STATUS, ANTEPARTUM: Status: RESOLVED | Noted: 2017-12-27 | Resolved: 2020-04-01

## 2022-12-26 ENCOUNTER — PATIENT OUTREACH (OUTPATIENT)
Dept: CARE COORDINATION | Facility: CLINIC | Age: 32
End: 2022-12-26

## 2022-12-26 ENCOUNTER — OFFICE VISIT (OUTPATIENT)
Dept: FAMILY MEDICINE | Facility: CLINIC | Age: 32
End: 2022-12-26
Payer: COMMERCIAL

## 2022-12-26 VITALS
SYSTOLIC BLOOD PRESSURE: 100 MMHG | WEIGHT: 132.8 LBS | TEMPERATURE: 98.8 F | DIASTOLIC BLOOD PRESSURE: 60 MMHG | RESPIRATION RATE: 14 BRPM | HEIGHT: 58 IN | HEART RATE: 87 BPM | BODY MASS INDEX: 27.88 KG/M2 | OXYGEN SATURATION: 99 %

## 2022-12-26 DIAGNOSIS — Z76.89 ENCOUNTER TO ESTABLISH CARE: Primary | ICD-10-CM

## 2022-12-26 DIAGNOSIS — S24.109S INJURY OF THORACIC SPINAL CORD, SEQUELA (H): ICD-10-CM

## 2022-12-26 DIAGNOSIS — Z23 NEED FOR VACCINATION: ICD-10-CM

## 2022-12-26 DIAGNOSIS — R29.898 LEFT LEG WEAKNESS: ICD-10-CM

## 2022-12-26 DIAGNOSIS — Q67.5: ICD-10-CM

## 2022-12-26 PROCEDURE — 99214 OFFICE O/P EST MOD 30 MIN: CPT | Mod: 25 | Performed by: FAMILY MEDICINE

## 2022-12-26 PROCEDURE — 90471 IMMUNIZATION ADMIN: CPT | Performed by: FAMILY MEDICINE

## 2022-12-26 PROCEDURE — 90686 IIV4 VACC NO PRSV 0.5 ML IM: CPT | Performed by: FAMILY MEDICINE

## 2022-12-26 RX ORDER — NORELGESTROMIN AND ETHINYL ESTRADIOL 150; 35 UG/D; UG/D
PATCH TRANSDERMAL
COMMUNITY
Start: 2022-04-11 | End: 2023-08-03

## 2022-12-26 NOTE — PROGRESS NOTES
"  Assessment & Plan     Encounter to establish care  Previous PCP no longer at this clinic.   Charts need to be merged, name was spelled wrong.     Need for vaccination  - INFLUENZA VACCINE >6 MONTHS (AFLURIA/FLUZONE)    Left leg weakness  Spinal deformity, congenital  Injury of thoracic spinal cord, sequela (H)  Per chart: She had a surgery that left her with neurological deficits, weakness in left leg. She had a strap replaced in 2019 with tilges. Will work with CCC to determine next steps - for now will refer to [facility]. Consider Tilges as an alternative.   - Orthotics and Prosthetics DME Other (Comments) (left leg brace, broken strap)  - Primary Care - Care Coordination Referral    Birth Control Counseling  Declines any birth control. Discussed being on PNV, she declined.          Return in about 4 months (around 4/26/2023).    Isabel Mccall MD  [facility] FARZANA Sommers is a 32 year old, presenting for the following health issues:  \"to fix her left leg \"  poss splint replacement      History of Present Illness       Reason for visit:  New leg splint        Leg pain.   Chart reviewed prior to arrival, nothing in the system but possibly in care everywhere.   After patient arrival, name was spelled wrong.   Other chart reviewed.     She has a left leg splint/brace that she got a few years ago and the straps are broken.   She has no other concerns at this time.       Review of Systems   Constitutional, HEENT, cardiovascular, pulmonary, gi and gu systems are negative, except as otherwise noted.      Objective    /60   Pulse 87   Temp 98.8  F (37.1  C) (Oral)   Resp 14   Ht 1.482 m (4' 10.35\")   Wt 60.2 kg (132 lb 12.8 oz)   LMP 12/23/2022   SpO2 99%   BMI 27.43 kg/m    Body mass index is 27.43 kg/m .  Physical Exam   GENERAL: healthy, alert and no distress  EYES: Eyes grossly normal to inspection  RESP: lungs clear to auscultation - no rales, rhonchi or " wheezes  CV: regular rate and rhythm, normal S1 S2, no S3 or S4, no murmur, click or rub, no peripheral edema and peripheral pulses strong  MS: no gross musculoskeletal defects noted, no edema. Left lower leg weakness  SKIN: no suspicious lesions or rashes  NEURO: Normal strength and tone, mentation intact and speech normal  PSYCH: mentation appears normal, affect normal/bright

## 2022-12-26 NOTE — PROGRESS NOTES
Clinic Care Coordination Contact  Community Health Worker Initial Outreach    CHW Initial Information Gathering:  Referral Source: PCP  Preferred Hospital: [facility]  [phone]  Preferred Urgent Care: [facility], [phone]  Current living arrangement:: I live in a private home with family  Type of residence:: Apartment  Community Resources: None  Supplies Currently Used at Home: None  Equipment Currently Used at Home: none  Informal Support system:: Family, Friends  No PCP office visit in Past Year: No  Transportation means:: Family, Medical transport, Friend  CHW Additional Questions  If ED/Hospital discharge, follow-up appointment scheduled as recommended?: N/A  Medication changes made following ED/Hospital discharge?: N/A  MyChart active?: No  Patient agreeable to assistance with activating MyChart?: No    Patient accepts CC: Yes. Patient scheduled for assessment with CCC RN on 1/04/2023 at 9:00 AM. Patient noted desire to discuss Referral placed to [facility] for new leg brace. She has a chart that needs to be merged [id number]. Last evaluated in 2019, new strap was ordered..

## 2023-01-04 ENCOUNTER — PATIENT OUTREACH (OUTPATIENT)
Dept: CARE COORDINATION | Facility: CLINIC | Age: 33
End: 2023-01-04

## 2023-01-04 NOTE — PROGRESS NOTES
Clinic Care Coordination Contact  CCRN wasn't able to reach patient today via phone x3. Phone Kept ringing and unable to leave a message. RN assessment rescheduled on 1/12/2023.

## 2023-01-12 ENCOUNTER — PATIENT OUTREACH (OUTPATIENT)
Dept: CARE COORDINATION | Facility: CLINIC | Age: 33
End: 2023-01-12

## 2023-01-23 ENCOUNTER — PATIENT OUTREACH (OUTPATIENT)
Dept: CARE COORDINATION | Facility: CLINIC | Age: 33
End: 2023-01-23

## 2023-01-23 NOTE — LETTER
January 23, 2023    Kushal Vega Thoo  [address]   [place] [address]      Dear Kushal Vega,        I am a clinic care coordinator who works with Isabel Mccall MD with the [facility]. I have been trying to reach you recently to introduce Clinic Care Coordination. I recently tried to call and was unable to reach you. Below is a description of clinic care coordination and how I can further assist you.       The clinic care coordination team is made up of a registered nurse, , financial resource worker and community health worker who understand the health care system. The goal of clinic care coordination is to help you manage your health and improve access to the health care system. Our team works alongside your provider to assist you in determining your health and social needs. We can help you obtain health care and community resources, providing you with necessary information and education. We can work with you through any barriers and develop a care plan that helps coordinate and strengthen the communication between you and your care team.    Please feel free to contact me with any questions or concerns regarding care coordination and what we can offer.      We are focused on providing you with the highest-quality healthcare experience possible.    Sincerely,       Gloria Shields, RN    Care Coordinator  [facility]   [address]  [address]  [place] [address]   Office: [phone]  Fax: [phone]

## 2023-01-23 NOTE — PROGRESS NOTES
Clinic Care Coordination Contact  [facility]/Voicemail       Clinical Data: Care Coordinator Outreach  Outreach attempted x 3.  Left message on patient's voicemail with call back information and requested return call.  Plan: Care Coordinator will send unable to contact letter with care coordinator contact information via [place]. Care Coordinator will do no further outreaches at this time.    CCRN again wasn't able to reach patient x3 today. Voicemail not set up. CCRN updated PCP and will do no further outreach.

## 2023-04-26 ENCOUNTER — OFFICE VISIT (OUTPATIENT)
Dept: FAMILY MEDICINE | Facility: CLINIC | Age: 33
End: 2023-04-26
Payer: COMMERCIAL

## 2023-04-26 VITALS
HEART RATE: 81 BPM | WEIGHT: 144 LBS | SYSTOLIC BLOOD PRESSURE: 100 MMHG | RESPIRATION RATE: 16 BRPM | BODY MASS INDEX: 30.23 KG/M2 | HEIGHT: 58 IN | TEMPERATURE: 98.3 F | OXYGEN SATURATION: 99 % | DIASTOLIC BLOOD PRESSURE: 62 MMHG

## 2023-04-26 DIAGNOSIS — S24.101A: Primary | ICD-10-CM

## 2023-04-26 DIAGNOSIS — R29.898 LEFT LEG WEAKNESS: ICD-10-CM

## 2023-04-26 DIAGNOSIS — K08.89 PAIN, DENTAL: ICD-10-CM

## 2023-04-26 DIAGNOSIS — Q67.5: ICD-10-CM

## 2023-04-26 PROCEDURE — 99214 OFFICE O/P EST MOD 30 MIN: CPT | Performed by: FAMILY MEDICINE

## 2023-04-26 ASSESSMENT — PATIENT HEALTH QUESTIONNAIRE - PHQ9
10. IF YOU CHECKED OFF ANY PROBLEMS, HOW DIFFICULT HAVE THESE PROBLEMS MADE IT FOR YOU TO DO YOUR WORK, TAKE CARE OF THINGS AT HOME, OR GET ALONG WITH OTHER PEOPLE: NOT DIFFICULT AT ALL
SUM OF ALL RESPONSES TO PHQ QUESTIONS 1-9: 0
SUM OF ALL RESPONSES TO PHQ QUESTIONS 1-9: 0

## 2023-04-26 NOTE — PROGRESS NOTES
"  Assessment & Plan     Spinal cord injury at T1-T6 level without injury of spinal bone (H)  Spinal deformity, congenital  Left leg weakness  Patient was really annoyed that \"nothing has been done since the last visit.\" Patient did not answer the phone when I called and did not answer when CCC RN (who is fluent in [place]) called x3 days, each time she called x3 and left a message. Patient said she will answer her phone if and when she is called. We will try one more time to get her to work with CCC. Referral was placed to [facility] in December.   - Primary Care - Care Coordination Referral    Pain, dental  Patient wants to go back to dental, has dental pain and thinks she will need extraction.   - Dental Referral        Patient needs   BMI:   Estimated body mass index is 29.74 kg/m  as calculated from the following:    Height as of this encounter: 1.482 m (4' 10.35\").    Weight as of this encounter: 65.3 kg (144 lb).   Weight management plan: Discussed healthy diet and exercise guidelines    Return in about 3 months (around 7/26/2023) for Routine preventive.    30 minutes spent on the date of the encounter doing chart review, history and exam, documentation and further activities per the note      Isabel Mccall MD  [facility]   Kushal Vega is a 32 year old, presenting for the following health issues:  Leg Pain (Left leg pain since having surgery 7-8 years ago )      History of Present Illness       Reason for visit:  Left leg pain  Symptom onset:  More than a month     Today's PHQ-9         PHQ-9 Total Score: 0    PHQ-9 Q9 Thoughts of better off dead/self-harm past 2 weeks :   Not at all    How difficult have these problems made it for you to do your work, take care of things at home, or get along with other people: Not difficult at all       Patient needed new orthotics and never answered her phone when CCC attempted. Discussed with CCC and we both decided it was appropriate to " "graduate. We cannot assist the patient if she does not answer her phone.     Patient insists that either no one called her or they did no call the right number or they called without an . These things are not true.     She has some dental pain and wants to see the dentist.   No new issues, just the same leg pain and needing a new prosthesis like before.         Review of Systems   Constitutional, HEENT, cardiovascular, pulmonary, gi and gu systems are negative, except as otherwise noted.      Objective    /62   Pulse 81   Temp 98.3  F (36.8  C) (Oral)   Resp 16   Ht 1.482 m (4' 10.35\")   Wt 65.3 kg (144 lb)   LMP 04/19/2023 (Approximate)   SpO2 99%   BMI 29.74 kg/m    Body mass index is 29.74 kg/m .  Physical Exam   GENERAL: healthy, alert and no distress  NECK: no adenopathy, no asymmetry, masses, or scars and thyroid normal to palpation  RESP: lungs clear to auscultation - no rales, rhonchi or wheezes  CV: regular rate and rhythm, normal S1 S2, no S3 or S4, no murmur, click or rub, no peripheral edema and peripheral pulses strong  ABDOMEN: soft, nontender, no hepatosplenomegaly, no masses and bowel sounds normal  MS: no gross musculoskeletal defects noted, no edema    Office Visit - [facility] on 04/01/2020   Component Date Value Ref Range Status     hCG Urine Qualitative 04/01/2020 Negative  Negative Final     No results found for any visits on 04/26/23.  No results found for this or any previous visit (from the past 24 hour(s)).                "

## 2023-04-27 ENCOUNTER — PATIENT OUTREACH (OUTPATIENT)
Dept: CARE COORDINATION | Facility: CLINIC | Age: 33
End: 2023-04-27
Payer: COMMERCIAL

## 2023-04-27 NOTE — PROGRESS NOTES
Clinic Care Coordination Contact  Community Health Worker Initial Outreach  Spoke with patient through [place]  ID [id number]    CHW Initial Information Gathering:  Referral Source: PCP  Current living arrangement:: I live in a private home with family  Type of residence:: Apartment  Community Resources: [place] Programs (Medical Assistance, SNAP)  Supplies Currently Used at Home: None  Equipment Currently Used at Home: cane, straight, other (see comments) (Left prothetic leg)  Informal Support system:: Family  No PCP office visit in Past Year: No  Transportation means:: Medical transport  CHW Additional Questions  If ED/Hospital discharge, follow-up appointment scheduled as recommended?: N/A  Medication changes made following ED/Hospital discharge?: N/A  MyChart active?: No  Patient agreeable to assistance with activating MyChart?: No    Patient accepts CC: Yes. Patient scheduled for assessment with NINA Mackay on 5/4/23 at 3PM. Patient noted desire to discuss CCC and support with navigating medical appointment for left leg prosthesis. Support with correct name spelling. [employer] [phone] confirmed for transportation.     Chart Review: Referral from PCP   Reason for Referral: Needs new leg brace, referred to [facility]    Re: prosthesis. Patient confirmed phone number, she says she will answer the phone when called. Can we help get her to tilges and to dental? Name is also wrong in chart.    Leta Epperson  Clinic Care Coordination  [facility]    Phone: [phone]         Full Thickness Lip Wedge Repair (Flap) Text: Given the location of the defect and the proximity to free margins a full thickness wedge repair was deemed most appropriate.  Using a sterile surgical marker, the appropriate repair was drawn incorporating the defect and placing the expected incisions perpendicular to the vermilion border.  The vermilion border was also meticulously outlined to ensure appropriate reapproximation during the repair.  The area thus outlined was incised through and through with a #15 scalpel blade.  The muscularis and dermis were reaproximated with deep sutures following hemostasis. Care was taken to realign the vermilion border before proceeding with the superficial closure.  Once the vermilion was realigned the superfical and mucosal closure was finished.

## 2023-05-04 ENCOUNTER — PATIENT OUTREACH (OUTPATIENT)
Dept: NURSING | Facility: CLINIC | Age: 33
End: 2023-05-04
Payer: COMMERCIAL

## 2023-05-04 ASSESSMENT — ACTIVITIES OF DAILY LIVING (ADL): DEPENDENT_IADLS:: INDEPENDENT

## 2023-05-04 NOTE — LETTER
[facility]  Patient Centered Plan of Care  About Me:        Patient Name:  Kushal Sommers    YOB: 1990  Age:         32 year old   [place] MRN:    [id number] Telephone Information:  Home Phone [phone]   Mobile [phone]   Mobile Not on file.       Address:  [address]  [place] [address] Email address:  No e-mail address on record      Emergency Contact(s)    Name Relationship Lgl Grd Work Phone Home Phone Mobile Phone   1. LUISITO JORGE Aunt    [phone]   2. LILY Aunt   [phone]            Primary language:  [place]     needed? Yes   [employer]:  [phone] op. 1  Other communication barriers:Language barrier    Preferred Method of Communication:     Current living arrangement: I live in a private home with family    Mobility Status/ Medical Equipment: Independent w/Device        Health Maintenance  Health Maintenance Reviewed: Due/Overdue ***      My Access Plan  Medical Emergency 911   Primary Clinic Line [place] [phone]   24 Hour Appointment Line [phone] or  [phone] ([phone]) (toll-free)   24 Hour Nurse Line [phone] (toll-free)   Preferred Urgent Care [facility] [phone]       Preferred Hospital [facility]  [phone]       Preferred Pharmacy [facility] - [place], [place] - [address]     Behavioral Health Crisis Line The National Suicide Prevention Lifeline at 1-614.638.2234 or Text/Call 168             My Care Team Members  Patient Care Team         Relationship Specialty Notifications Start End    Isabel Mccall MD PCP - General Family Medicine  12/26/22     Merged    Phone: [phone] Fax: [phone]         [address] [place] [address]    Isabel Mccall MD Assigned PCP   8/13/22     Phone: [phone] Fax: [phone]         [address] [place] [address]     Rossi Pascal MD  Family Practice  10/10/22     Merged    Phone: [phone] Fax: [phone]         [address] [place] [address]    Leta Epperson Community Health Worker Primary Care - CC Admissions 4/26/23     Gloria Shiedls RN Lead Care Coordinator Primary Care - [facility] Admissions 4/27/23               My Care Plans  Self Management and Treatment Plan  Care Plan  Care Plan: Prosthetic       Problem: Left leg weakness       Goal: Patient would like to be scheduled and attend follow up appointment with Anil by the end of 2023.       Start Date: 5/4/2023 Expected End Date: 12/31/2023    Note:     Barriers: language barrier, low literacy, noncompliance, and lack of knowledge how to navigate complex health care system  Strengths: motivated to attend appt  Patient expressed understanding of goal: Yes    Action steps to achieve this goal:  1. I will answer my phone when I am contacted to schedule my appointment.  2. I will attend my initial appointment as scheduled with Anil TBD  3. I will schedule a follow up appointment with a provider if it is recommended to do so while I am at the clinic.  4. I will follow up with CCC regarding this goal at each outreach until it is completed.                                  Action Plans on File:                       Advance Care Plans/Directives Type:   No data recorded    My Medical and Care Information  Problem List   Patient Active Problem List   Diagnosis     Vitamin D Deficiency     Major Depression, Single Episode With Psychotic Features     Congenital Spinal Deformity     Spinal cord injury at T1-T6 level without injury of spinal bone (H)     Spinal deformity, congenital      Current Medications and Allergies:  See printed Medication Report.    Care Coordination Start Date: 4/26/2023   Frequency of Care Coordination: 6 weeks       Form Last Updated: 05/08/2023

## 2023-05-04 NOTE — PROGRESS NOTES
Clinic Care Coordination Contact    Clinic Care Coordination Contact  OUTREACH    Referral Information:  Referral Source: PCP    Primary Diagnosis: Other (include Comment box) (Prosthesis)    Chief Complaint   Patient presents with     Clinic Care Coordination - Initial     Clinic Utilization  Difficulty keeping appointments:: No  Compliance Concerns: No  No-Show Concerns: No  No PCP office visit in Past Year: No  Utilization    Hospital Admissions  0             ED Visits  0             No Show Count (past year)  3                Current as of: 5/5/2023  4:26 AM            Clinical Concerns:  CCRN spoke with patient via phone. Patient was referral to CCC by PCP to assist with   Provider Comments 04/26/2023  9:31 AM Isabel Mccall MD Provider Comments Note only with CE -   Note:    Re: prosthesis. Patient confirmed phone number, she says she will answer the phone when called      Left prosthesis   - Patient states she doesn't want to go to [facility] because she doesn't think they did a good job.   - Patient would like to go to [facility] instead.   - PCP need to place a new referral. Message sent to PCP today.       Pain  Pain (GOAL):: No  Health Maintenance Reviewed: Due/Overdue   Clinical Pathway: None    Medication Management:  Medication review status: Medications reviewed and no changes reported per patient.             Functional Status:  Dependent ADLs:: Independent  Dependent IADLs:: Independent  Bed or wheelchair confined:: No  Mobility Status: Independent w/Device  Fallen 2 or more times in the past year?: No  Any fall with injury in the past year?: No    Living Situation:  Current living arrangement:: I live in a private home with family  Type of residence:: Apartment    Lifestyle & Psychosocial Needs:    Social Determinants of Health     Tobacco Use: Low Risk  (4/26/2023)    Patient History      Smoking Tobacco Use: Never      Smokeless Tobacco Use: Never      Passive Exposure: Never   Alcohol Use: Not on  file   Financial Resource Strain: Low Risk  (5/8/2023)    Overall Financial Resource Strain (CARDIA)      Difficulty of Paying Living Expenses: Not hard at all   Food Insecurity: Not on file   Transportation Needs: Unmet Transportation Needs (5/8/2023)    PRAPARE - Transportation      Lack of Transportation (Medical): Yes      Lack of Transportation (Non-Medical): Yes   Physical Activity: Inactive (5/8/2023)    Exercise Vital Sign      Days of Exercise per Week: 0 days      Minutes of Exercise per Session: 0 min   Stress: Not on file   Social Connections: Not on file   Intimate Partner Violence: Not on file   Depression: Not at risk (4/26/2023)    PHQ-2      PHQ-2 Score: 0   Housing Stability: Unknown (5/8/2023)    Housing Stability Vital Sign      Unable to Pay for Housing in the Last Year: No      Number of Places Lived in the Last Year: 1      Unstable Housing in the Last Year: Not on file     Diet:: Regular  Inadequate nutrition (GOAL):: No  Tube Feeding: No  Inadequate activity/exercise (GOAL):: Yes  Significant changes in sleep pattern (GOAL): No  Transportation means:: Medical transport     Mental health DX:: No  Mental health management concern (GOAL):: No  Chemical Dependency Status: No Current Concerns  Informal Support system:: Family, Children      Resources and Interventions:  Current Resources:      Community Resources: County Programs, DME (Medical Assistance, SNAP)  Supplies Currently Used at Home: None  Equipment Currently Used at Home: cane, straight, other (see comments), prosthesis (Left prothetic leg)  Employment Status: unemployed     Advance Care Plan/Directive  Advanced Care Plans/Directives on file:: No  Advanced Care Plan/Directive Status: Declined Further Information    Referrals Placed: None     Care Plan:  Care Plan: Prosthetic     Problem: Left leg weakness     Goal: Patient would like to be scheduled and attend follow up appointment with Anil by the end of 2023.     Start Date:  5/4/2023 Expected End Date: 12/31/2023    Note:     Barriers: language barrier, low literacy, noncompliance, and lack of knowledge how to navigate complex health care system  Strengths: motivated to attend appt  Patient expressed understanding of goal: Yes    Action steps to achieve this goal:  1. I will answer my phone when I am contacted to schedule my appointment.  2. I will attend my initial appointment as scheduled with Anil TBD  3. I will schedule a follow up appointment with a provider if it is recommended to do so while I am at the clinic.  4. I will follow up with CCC regarding this goal at each outreach until it is completed.                           Outreach Frequency: 6 weeks  Future Appointments              In 2 months Isabel Mccall MD [facility] CHARLENE Kapoor SPRTASHIA          Plan:   1) PCP to place a new referral to Anil to get a new/fix prosthetisc   2) CHW to assist with appt once the order in and check on status.

## 2023-05-08 SDOH — HEALTH STABILITY: PHYSICAL HEALTH: ON AVERAGE, HOW MANY DAYS PER WEEK DO YOU ENGAGE IN MODERATE TO STRENUOUS EXERCISE (LIKE A BRISK WALK)?: 0 DAYS

## 2023-05-08 SDOH — ECONOMIC STABILITY: HOUSING INSECURITY: IN THE LAST 12 MONTHS, WAS THERE A TIME WHEN YOU WERE NOT ABLE TO PAY THE MORTGAGE OR RENT ON TIME?: NO

## 2023-05-08 SDOH — ECONOMIC STABILITY: HOUSING INSECURITY: IN THE LAST 12 MONTHS, HOW MANY PLACES HAVE YOU LIVED?: 1

## 2023-05-08 SDOH — ECONOMIC STABILITY: FOOD INSECURITY: HOW HARD IS IT FOR YOU TO PAY FOR THE VERY BASICS LIKE FOOD, HOUSING, MEDICAL CARE, AND HEATING?: NOT HARD AT ALL

## 2023-05-08 SDOH — ECONOMIC STABILITY: TRANSPORTATION INSECURITY: IN THE PAST 12 MONTHS, HAS LACK OF TRANSPORTATION KEPT YOU FROM MEDICAL APPOINTMENTS OR FROM GETTING MEDICATIONS?: YES

## 2023-05-08 SDOH — HEALTH STABILITY: PHYSICAL HEALTH: ON AVERAGE, HOW MANY MINUTES DO YOU ENGAGE IN EXERCISE AT THIS LEVEL?: 0 MIN

## 2023-05-08 ASSESSMENT — ACTIVITIES OF DAILY LIVING (ADL): LACK_OF_TRANSPORTATION: YES

## 2023-05-26 ENCOUNTER — PATIENT OUTREACH (OUTPATIENT)
Dept: CARE COORDINATION | Facility: CLINIC | Age: 33
End: 2023-05-26
Payer: COMMERCIAL

## 2023-05-26 ASSESSMENT — ACTIVITIES OF DAILY LIVING (ADL): DEPENDENT_IADLS:: INDEPENDENT

## 2023-05-26 NOTE — PROGRESS NOTES
Clinic Care Coordination Contact    Community Health Worker Follow Up  Spoke with patient through Tabby  ID [id number]    Care Gaps:     Health Maintenance Due   Topic Date Due     ADVANCE CARE PLANNING  Never done     DEPRESSION ACTION PLAN  Never done     HEPATITIS B IMMUNIZATION (3 of 3 - 19+ 3-dose series) 08/06/2014     YEARLY PREVENTIVE VISIT  05/21/2016     PAP  12/22/2020     COVID-19 Vaccine (3 - Pfizer series) 12/04/2021     Scheduled 8/3/23 at 9:40AM with Dr. Mccall for preventive care visit.      Care Plan:   Care Plan: Prosthetic     Problem: Left leg weakness     Goal: Patient would like to be scheduled and attend follow up appointment with Anil by the end of 2023.     Start Date: 5/4/2023 Expected End Date: 12/31/2023    This Visit's Progress: 0%    Note:     Barriers: language barrier, low literacy, noncompliance, and lack of knowledge how to navigate complex health care system  Strengths: motivated to attend appt  Patient expressed understanding of goal: Yes    Action steps to achieve this goal:  1. I will answer my phone when I am contacted to schedule my appointment.  2. I will attend my initial appointment as scheduled with Anil TBD  3. I will schedule a follow up appointment with a provider if it is recommended to do so while I am at the clinic.  4. I will follow up with CCC regarding this goal at each outreach until it is completed.     Note: CHW                       Intervention and Education during outreach:     Per chart review, still need new referral for Anil to get a new/fix prosthetic from PCP.       CHW reviewed above with patient and ensured her that CHW will follow up with PCP and assist with appointment as needed. PCP is on vacation this week. Patient expressed understanding.     CHW Plan: CHW to follow up in 1 month. Routing to lead clinician and PCP for review.     Leta Epperson  Clinic Care Coordination  [facility]    Phone:  [phone]

## 2023-06-06 ENCOUNTER — PATIENT OUTREACH (OUTPATIENT)
Dept: NURSING | Facility: CLINIC | Age: 33
End: 2023-06-06
Payer: COMMERCIAL

## 2023-06-06 DIAGNOSIS — S24.101A: Primary | ICD-10-CM

## 2023-06-06 DIAGNOSIS — Q67.5 CONGENITAL MUSCULOSKELETAL DEFORMITY OF SPINE: ICD-10-CM

## 2023-06-06 DIAGNOSIS — R29.898 LEFT LEG WEAKNESS: ICD-10-CM

## 2023-06-06 DIAGNOSIS — Q67.5: ICD-10-CM

## 2023-06-06 NOTE — PROGRESS NOTES
Clinic Care Coordination Contact    Follow Up Progress Note      Assessment: CCRN consulted with PCP and PCP agreed to place an order for prosthetic leg brace, broken strap.   CCRN called [facility] at [phone] today. Was told to fax over the order to fax: [phone] and fax: [phone]. Goal updated today. CHW to follow up on referral status with next outreach. Patient is scheduled for preventative care with PCP on 8/3/2023.     Care Gaps:    Health Maintenance Due   Topic Date Due     ADVANCE CARE PLANNING  Never done     DEPRESSION ACTION PLAN  Never done     HEPATITIS B IMMUNIZATION (3 of 3 - 19+ 3-dose series) 08/06/2014     YEARLY PREVENTIVE VISIT  05/21/2016     PAP  12/22/2020     COVID-19 Vaccine (3 - Pfizer series) 12/04/2021       Patient will address overdue care gaps with PCP on 8/3/2023    Care Plans  Care Plan: Prosthetic     Problem: Left leg weakness     Goal: Patient would like to be scheduled and attend follow up appointment with Anil by the end of 2023.     Start Date: 5/4/2023 Expected End Date: 12/31/2023    Recent Progress: 0%    Note:     Barriers: language barrier, low literacy, noncompliance, and lack of knowledge how to navigate complex health care system  Strengths: motivated to attend appt  Patient expressed understanding of goal: Yes    Action steps to achieve this goal:  1. I will answer my phone when I am contacted to schedule my appointment.  2. I will attend my initial appointment as scheduled with Anil TBD  3. I will schedule a follow up appointment with a provider if it is recommended to do so while I am at the clinic.  4. I will follow up with CCC regarding this goal at each outreach until it is completed.     Note:   6/6/2023 - CCRN faxed over the order to [facility] at Fax: [phone] and fax: [phone].                          Plan: CHW to follow up on referral status with next outreach.

## 2023-06-28 ENCOUNTER — PATIENT OUTREACH (OUTPATIENT)
Dept: CARE COORDINATION | Facility: CLINIC | Age: 33
End: 2023-06-28
Payer: COMMERCIAL

## 2023-06-28 ASSESSMENT — ACTIVITIES OF DAILY LIVING (ADL): DEPENDENT_IADLS:: INDEPENDENT

## 2023-06-28 NOTE — PROGRESS NOTES
Clinic Care Coordination Contact    Community Health Worker Follow Up  Spoke with patient through Tabby  ID [id number] (Ehtoora)    Care Gaps:     Health Maintenance Due   Topic Date Due     ADVANCE CARE PLANNING  Never done     DEPRESSION ACTION PLAN  Never done     HEPATITIS B IMMUNIZATION (3 of 3 - 19+ 3-dose series) 05/01/2014     YEARLY PREVENTIVE VISIT  05/21/2016     PAP  12/22/2020     COVID-19 Vaccine (3 - Pfizer series) 12/04/2021     Scheduled 8/3 at 9:40AM with Dr. Mccall for preventive care visit       Care Plan:   Care Plan: Prosthetic     Problem: Left leg weakness     Goal: Patient would like to be scheduled and attend follow up appointment with Anil by the end of 2023.     Start Date: 5/4/2023 Expected End Date: 12/31/2023    This Visit's Progress: 30% Recent Progress: 0%    Note:     Barriers: language barrier, low literacy, noncompliance, and lack of knowledge how to navigate complex health care system  Strengths: motivated to attend appt  Patient expressed understanding of goal: Yes    Action steps to achieve this goal:  1. I will answer my phone when I am contacted to schedule my appointment.  2. I will attend my initial appointment as scheduled with Anil on 7/13/23 at 12:30PM.   3. I will schedule a follow up appointment with a provider if it is recommended to do so while I am at the clinic.  4. I will follow up with [facility] regarding this goal at each outreach until it is completed.     Note: 6/6/2023 - CCRN faxed over the order to [facility] at Fax: [phone] and fax: [phone].    [facility] [phone]  [address] [place] [id number]                    Intervention and Education during outreach:     CHW inquired if [facility] called her for an appointment. Patient confirmed, clinic called and she's scheduled on 7/13 at 12:30PM. Patient inquired about support with scheduling transportation, she will be going to appointment alone. CHW ensured patient,  will clarify address and assist with setting up transportaion.    CHW aaliyah Albrecht at [facility] [phone], confirmed address [facility] [address] [place] [id number].    CHW called [employer] [phone], ride confirmed with [employer] [phone].    CHW Plan: CHW to follow up in 1 month    Leta Epperson  Clinic Care Coordination  [facility]    Phone: [phone]

## 2023-07-19 ENCOUNTER — PATIENT OUTREACH (OUTPATIENT)
Dept: CARE COORDINATION | Facility: CLINIC | Age: 33
End: 2023-07-19
Payer: COMMERCIAL

## 2023-07-19 NOTE — LETTER
[facility]  Patient Centered Plan of Care  About Me:        Patient Name:  Kushal Sommers    YOB: 1990  Age:         32 year old   Edy MRN:    [id number] Telephone Information:  Home Phone [phone]   Mobile [phone]   Mobile Not on file.       Address:  [address]  [place] [address] Email address:  No e-mail address on record      Emergency Contact(s)    Name Relationship Lgl Grd Work Phone Home Phone Mobile Phone   1. LUISITO JORGE Aunt    [phone]   2. LILY Aunt   [phone]            Primary language:  Tabby     needed? Yes   [place] [facility]:  [phone] op. 1  Other communication barriers:Language barrier    Preferred Method of Communication:     Current living arrangement: I live in a private home with family    Mobility Status/ Medical Equipment: Independent w/Device        Health Maintenance  Health Maintenance Reviewed: Due/Overdue       My Access Plan  Medical Emergency 911   Primary Clinic Line [facility] [phone]   24 Hour Appointment Line [phone] or  [phone]) (toll-free)   24 Hour Nurse Line [phone] (toll-free)   Preferred Urgent Care [facility] [phone]     [facility]  [phone]     Preferred Pharmacy [facility] - [place], [place] - [address]     Behavioral Health Crisis Line The National Suicide Prevention Lifeline at 1-410.234.4761 or Text/Call 658             My Care Team Members  Patient Care Team         Relationship Specialty Notifications Start End    Isabel Mccall MD PCP - General Family Medicine  12/26/22     Merged    Phone: [phone] Fax: [phone]         [address] [facility] [address] [place] [address]    Isabel Mccall MD Assigned PCP   8/13/22     Phone: [phone] Fax: [phone]         [address] [facility] [address] [place] [address]    Rossi Pascal  MD [facility]AN[facility]  Family Practice  10/10/22     Merged    Phone: [phone] Fax: [phone]         [address] [place] [address]    Leta Epperson Community Health Worker Primary Care - [facility] Admissions 4/26/23     Gloria Shields, RN Lead Care Coordinator Primary Care - [facility] Admissions 4/27/23               My Care Plans  Self Management and Treatment Plan  Care Plan  Care Plan: Prosthetic       Problem: Left leg weakness       Goal: Patient would like to be scheduled and attend follow up appointment with Anil by the end of 2023.       Start Date: 5/4/2023 Expected End Date: 12/31/2023    Recent Progress: 30%    Note:     Barriers: language barrier, low literacy, noncompliance, and lack of knowledge how to navigate complex health care system  Strengths: motivated to attend appt  Patient expressed understanding of goal: Yes    Action steps to achieve this goal:  1. I will answer my phone when I am contacted to schedule my appointment.  2. I will attend my initial appointment as scheduled with Anil on 7/13/23 at 12:30PM. ( Completed)  3. I will schedule a follow up appointment with a provider if it is recommended to do so while I am at the clinic.  4. I will follow up with CCC regarding this goal at each outreach until it is completed.     Note: 6/6/2023 - CCRN faxed over the order to [facility] at Fax: [phone] and fax: [phone].    [facility] [phone]  [facility] [place] [address]                               Action Plans on File:                       Advance Care Plans/Directives Type:   No data recorded    My Medical and Care Information  Problem List   Patient Active Problem List   Diagnosis    Vitamin D Deficiency    Major Depression, Single Episode With Psychotic Features    Congenital Spinal Deformity    Spinal cord injury at T1-T6 level without injury of spinal bone (H)    Spinal deformity, congenital      Current Medications and Allergies:  See printed  Medication Report.    Care Coordination Start Date: 4/26/2023   Frequency of Care Coordination: monthly     Form Last Updated: 07/19/2023

## 2023-07-26 NOTE — PROGRESS NOTES
Clinic Care Coordination Contact  Follow Up Progress Note      Assessment: CCRN spoke with patient today via phone. Patient confirmed she attended her appt with [facility] on 7/13/2023. Per patient, they fixed the strap and leg brace. Patient states she inquired if she could qualify for a brand new prosthetic leg but was told that they will need a new order from PCP. Patient plans to discuss this with PCP on 8/3/2023. CCRN moved CHW outreach from 7/28 to 8/4 to follow up with patient post PCP appt. CHW to assist with [facility] appt if PCP agrees to place a new referral to replace prosthetic leg.     Care Gaps:    Health Maintenance Due   Topic Date Due    ADVANCE CARE PLANNING  Never done    DEPRESSION ACTION PLAN  Never done    HEPATITIS B IMMUNIZATION (3 of 3 - 19+ 3-dose series) 05/01/2014    YEARLY PREVENTIVE VISIT  05/21/2016    PAP  12/22/2020    COVID-19 Vaccine (3 - Pfizer series) 12/04/2021       Pateint is scheduled for preventative care with PCP on 8/3/2023 and plans to address care gaps during PCP appt.     Care Plans  Care Plan: Prosthetic       Problem: Left leg weakness       Goal: Patient would like to be scheduled and attend follow up appointment with [facility] by the end of 2023.       Start Date: 5/4/2023 Expected End Date: 12/31/2023    Recent Progress: 30%    Note:     Barriers: language barrier, low literacy, noncompliance, and lack of knowledge how to navigate complex health care system  Strengths: motivated to attend appt  Patient expressed understanding of goal: Yes    Action steps to achieve this goal:  1. I will answer my phone when I am contacted to schedule my appointment.  2. I will attend my initial appointment as scheduled with [facility] on 7/13/23 at 12:30PM. ( Completed)  3. I will schedule a follow up appointment with a provider if it is recommended to do so while I am at the clinic.  4. I will follow up with CCC regarding this goal at each outreach until it is  completed.     Note: 6/6/2023 - CCRN faxed over the order to [facility] at Fax: [phone] and fax: [phone].    [facility] [phone]  [address] [place] [address]                              Plan: Patient will attend her appt with PCP on 8/3/2023

## 2023-08-01 ENCOUNTER — TELEPHONE (OUTPATIENT)
Dept: FAMILY MEDICINE | Facility: CLINIC | Age: 33
End: 2023-08-01
Payer: COMMERCIAL

## 2023-08-01 NOTE — TELEPHONE ENCOUNTER
Patient Quality Outreach    Patient is due for the following:   Cervical Cancer Screening - PAP Needed    Next Steps:   Patient has upcoming appointment, these items will be addressed at that time.    Type of outreach:    Chart review performed, no outreach needed.      Questions for provider review:    None           Patsy Kauffman MA

## 2023-08-03 ENCOUNTER — OFFICE VISIT (OUTPATIENT)
Dept: FAMILY MEDICINE | Facility: CLINIC | Age: 33
End: 2023-08-03
Attending: FAMILY MEDICINE
Payer: COMMERCIAL

## 2023-08-03 DIAGNOSIS — Q67.5 CONGENITAL MUSCULOSKELETAL DEFORMITY OF SPINE: ICD-10-CM

## 2023-08-03 DIAGNOSIS — Q67.5: ICD-10-CM

## 2023-08-03 DIAGNOSIS — S24.101A: ICD-10-CM

## 2023-08-03 DIAGNOSIS — F32.3 MAJOR DEPRESSIVE DISORDER, SINGLE EPISODE, SEVERE WITH PSYCHOTIC FEATURES (H): ICD-10-CM

## 2023-08-03 DIAGNOSIS — Z00.00 ROUTINE GENERAL MEDICAL EXAMINATION AT A HEALTH CARE FACILITY: Primary | ICD-10-CM

## 2023-08-03 DIAGNOSIS — Z12.4 SCREENING FOR CERVICAL CANCER: ICD-10-CM

## 2023-08-03 DIAGNOSIS — R29.898 LEFT LEG WEAKNESS: ICD-10-CM

## 2023-08-03 DIAGNOSIS — B35.4 TINEA CORPORIS: ICD-10-CM

## 2023-08-03 LAB
ALBUMIN SERPL BCG-MCNC: 4.5 G/DL (ref 3.5–5.2)
ALP SERPL-CCNC: 68 U/L (ref 35–104)
ALT SERPL W P-5'-P-CCNC: 9 U/L (ref 0–50)
ANION GAP SERPL CALCULATED.3IONS-SCNC: 8 MMOL/L (ref 7–15)
AST SERPL W P-5'-P-CCNC: 25 U/L (ref 0–45)
BILIRUB SERPL-MCNC: 0.5 MG/DL
BUN SERPL-MCNC: 12.6 MG/DL (ref 6–20)
CALCIUM SERPL-MCNC: 9.2 MG/DL (ref 8.6–10)
CHLORIDE SERPL-SCNC: 102 MMOL/L (ref 98–107)
CHOLEST SERPL-MCNC: 157 MG/DL
CREAT SERPL-MCNC: 0.61 MG/DL (ref 0.51–0.95)
DEPRECATED HCO3 PLAS-SCNC: 28 MMOL/L (ref 22–29)
ERYTHROCYTE [DISTWIDTH] IN BLOOD BY AUTOMATED COUNT: 11.7 % (ref 10–15)
GFR SERPL CREATININE-BSD FRML MDRD: >90 ML/MIN/1.73M2
GLUCOSE SERPL-MCNC: 90 MG/DL (ref 70–99)
HCT VFR BLD AUTO: 40.9 % (ref 35–47)
HDLC SERPL-MCNC: 43 MG/DL
HGB BLD-MCNC: 12.9 G/DL (ref 11.7–15.7)
LDLC SERPL CALC-MCNC: 101 MG/DL
MCH RBC QN AUTO: 29.7 PG (ref 26.5–33)
MCHC RBC AUTO-ENTMCNC: 31.5 G/DL (ref 31.5–36.5)
MCV RBC AUTO: 94 FL (ref 78–100)
NONHDLC SERPL-MCNC: 114 MG/DL
PLATELET # BLD AUTO: 254 10E3/UL (ref 150–450)
POTASSIUM SERPL-SCNC: 4.1 MMOL/L (ref 3.4–5.3)
PROT SERPL-MCNC: 8 G/DL (ref 6.4–8.3)
RBC # BLD AUTO: 4.35 10E6/UL (ref 3.8–5.2)
SODIUM SERPL-SCNC: 138 MMOL/L (ref 136–145)
TRIGL SERPL-MCNC: 67 MG/DL
TSH SERPL DL<=0.005 MIU/L-ACNC: 1.5 UIU/ML (ref 0.3–4.2)
WBC # BLD AUTO: 5.4 10E3/UL (ref 4–11)

## 2023-08-03 PROCEDURE — 85027 COMPLETE CBC AUTOMATED: CPT | Performed by: FAMILY MEDICINE

## 2023-08-03 PROCEDURE — 80061 LIPID PANEL: CPT | Performed by: FAMILY MEDICINE

## 2023-08-03 PROCEDURE — 80053 COMPREHEN METABOLIC PANEL: CPT | Performed by: FAMILY MEDICINE

## 2023-08-03 PROCEDURE — 36415 COLL VENOUS BLD VENIPUNCTURE: CPT | Performed by: FAMILY MEDICINE

## 2023-08-03 PROCEDURE — 99395 PREV VISIT EST AGE 18-39: CPT | Performed by: FAMILY MEDICINE

## 2023-08-03 PROCEDURE — 99213 OFFICE O/P EST LOW 20 MIN: CPT | Mod: 25 | Performed by: FAMILY MEDICINE

## 2023-08-03 PROCEDURE — 84443 ASSAY THYROID STIM HORMONE: CPT | Performed by: FAMILY MEDICINE

## 2023-08-03 RX ORDER — CLOTRIMAZOLE 1 %
CREAM (GRAM) TOPICAL 3 TIMES DAILY
Qty: 60 G | Refills: 3 | Status: SHIPPED | OUTPATIENT
Start: 2023-08-03 | End: 2024-09-20

## 2023-08-03 RX ORDER — AMOXICILLIN 500 MG/1
CAPSULE ORAL
COMMUNITY
Start: 2023-08-01 | End: 2024-09-20

## 2023-08-03 ASSESSMENT — ENCOUNTER SYMPTOMS
JOINT SWELLING: 0
WEAKNESS: 0
SORE THROAT: 0
HEADACHES: 0
DYSURIA: 0
FEVER: 0
HEARTBURN: 0
DIZZINESS: 0
HEMATURIA: 0
ABDOMINAL PAIN: 0
HEMATOCHEZIA: 0
DIARRHEA: 0
FREQUENCY: 0
COUGH: 0
CONSTIPATION: 0
CHILLS: 0
NAUSEA: 0
NERVOUS/ANXIOUS: 0
PARESTHESIAS: 0
SHORTNESS OF BREATH: 0
PALPITATIONS: 0
EYE PAIN: 0
BREAST MASS: 0
ARTHRALGIAS: 0
MYALGIAS: 0

## 2023-08-03 NOTE — Clinical Note
Please call patient and let her know all labs are normal and someone will call her about getting a new brace soon.

## 2023-08-03 NOTE — PROGRESS NOTES
SUBJECTIVE:   CC: Kushal Vega is an 32 year old who presents for preventive health visit.       Healthy Habits:     Getting at least 3 servings of Calcium per day:  NO    Bi-annual eye exam:  NO    Dental care twice a year:  NO    Sleep apnea or symptoms of sleep apnea:  None    Diet:  Regular (no restrictions)    Frequency of exercise:  None    Taking medications regularly:  Not Applicable    Medication side effects:  Not applicable    Additional concerns today:  Yes       Patient confirmed she attended her appt with [facility] on 2023. Per patient, they fixed the strap and leg brace. Patient states she inquired if she could qualify for a brand new prosthetic leg but was told that they will need a new order from PCP. Patient plans to discuss this with PCP on 8/3/2023. CCRN moved CHW outreach from  to  to follow up with patient post PCP appt. CHW to assist with [facility] appt if PCP agrees to place a new referral to replace prosthetic leg. She says the brace is painful but works ok, she would like to be fitted for a new one.  AFO brace. Will place new order.       Have you ever done Advance Care Planning? (For example, a Health Directive, POLST, or a discussion with a medical provider or your loved ones about your wishes): No, advance care planning information given to patient to review.  Patient plans to discuss their wishes with loved ones or provider.      Social History     Tobacco Use     Smoking status: Never     Passive exposure: Never     Smokeless tobacco: Never   Substance Use Topics     Alcohol use: No             8/3/2023     9:26 AM   Alcohol Use   Prescreen: >3 drinks/day or >7 drinks/week? Not Applicable     Reviewed orders with patient.  Reviewed health maintenance and updated orders accordingly - Yes      Breast Cancer Screenin/3/2023     9:26 AM   Breast CA Risk Assessment (FHS-7)   Do you have a family history of breast, colon, or ovarian cancer? No / Unknown      Pertinent mammograms are reviewed under the imaging tab.    History of abnormal Pap smear: NO - age 30-65 PAP every 5 years with negative HPV co-testing recommended      12/22/2017    10:50 AM   PAP / HPV   PAP Negative for squamous intraepithelial lesion or malignancy  Electronically signed by Jennifer Sanchez CT (ASCP) on 12/27/2017 at  3:21 PM        Reviewed and updated as needed this visit by clinical staff   Tobacco  Allergies  Meds  Problems  Med Hx  Surg Hx  Fam Hx          Reviewed and updated as needed this visit by Provider   Tobacco  Allergies  Meds  Problems  Med Hx  Surg Hx  Fam Hx             Review of Systems   Constitutional:  Negative for chills and fever.   HENT:  Negative for congestion, ear pain, hearing loss and sore throat.    Eyes:  Negative for pain and visual disturbance.   Respiratory:  Negative for cough and shortness of breath.    Cardiovascular:  Negative for chest pain, palpitations and peripheral edema.   Gastrointestinal:  Negative for abdominal pain, constipation, diarrhea, heartburn, hematochezia and nausea.   Breasts:  Negative for tenderness, breast mass and discharge.   Genitourinary:  Negative for dysuria, frequency, genital sores, hematuria, pelvic pain, urgency, vaginal bleeding and vaginal discharge.   Musculoskeletal:  Negative for arthralgias, joint swelling and myalgias.   Skin:  Negative for rash.   Neurological:  Negative for dizziness, weakness, headaches and paresthesias.   Psychiatric/Behavioral:  Negative for mood changes. The patient is not nervous/anxious.           OBJECTIVE:   LMP  (LMP Unknown)   Physical Exam  GENERAL: healthy, alert and no distress  NECK: no adenopathy, no asymmetry, masses, or scars and thyroid normal to palpation  RESP: lungs clear to auscultation - no rales, rhonchi or wheezes  CV: regular rate and rhythm, normal S1 S2, no S3 or S4, no murmur, click or rub, no peripheral edema and peripheral pulses strong  ABDOMEN:  soft, nontender, no hepatosplenomegaly, no masses and bowel sounds normal  MS: no gross musculoskeletal defects noted, no edema        ASSESSMENT/PLAN:   Wimae Vega was seen today for physical.    Diagnoses and all orders for this visit:    Routine general medical examination at a health care facility  -     TSH with free T4 reflex  -     Comprehensive metabolic panel (BMP + Alb, Alk Phos, ALT, AST, Total. Bili, TP)  -     CBC with platelets  -     Lipid panel reflex to direct LDL Non-fasting    Screening for cervical cancer  Risks discussed and patient adamantly refused. Will discuss at future visit.     Major depressive disorder, single episode, severe with psychotic features (H)  Refused any interventions.     Left leg weakness  Spinal deformity, congenital  Spinal cord injury at T1-T6 level without injury of spinal bone (H)  Congenital Spinal Deformity  Patient's AFO brace pinches and causes some pain, she would like to be fitted for a new one if possible. Recently had it repaired. She would like it to go to [place].   -     Orthotics and Prosthetics DME Other (Comments) (AFO brace)    Tinea corporis  -     clotrimazole (LOTRIMIN) 1 % external cream; Apply topically 3 times daily      Other orders  -     REVIEW OF HEALTH MAINTENANCE PROTOCOL ORDERS  -     PRIMARY CARE FOLLOW-UP SCHEDULING      She wants assistance applying for government housing. She currently lives with her cousin and her kid, she does not work. She is on social security. No financial or food insecurity. She would like to live on her own with her kid.       Rash on both elbows, raided and itchy. Comes and goes.   Patient has been advised of split billing requirements and indicates understanding: Yes      COUNSELING:  Reviewed preventive health counseling, as reflected in patient instructions        She reports that she has never smoked. She has never been exposed to tobacco smoke. She has never used smokeless tobacco.      Isabel Mccall MD  M  [facility]

## 2023-08-07 NOTE — PROGRESS NOTES
Patient notified per MD note that all labs normal - someone will call patient to coordinate procurement of new brace soon. The patient verbalizes understanding of provider/CSS instructions for follow-up and continued care per provider message.

## 2023-08-11 ENCOUNTER — PATIENT OUTREACH (OUTPATIENT)
Dept: CARE COORDINATION | Facility: CLINIC | Age: 33
End: 2023-08-11
Payer: COMMERCIAL

## 2023-08-11 ASSESSMENT — ACTIVITIES OF DAILY LIVING (ADL): DEPENDENT_IADLS:: INDEPENDENT

## 2023-08-11 NOTE — PROGRESS NOTES
Clinic Care Coordination Contact  Community Health Worker Follow Up  Spoke with patient through [facility]  ID [id number] ([place])    Care Gaps:     Health Maintenance Due   Topic Date Due    COVID-19 Vaccine (3 - Pfizer series) 12/04/2021     Postponed to next outreach.      Care Plan:   Care Plan: Prosthetic       Problem: Left leg weakness       Goal: Patient would like to be scheduled and attend follow up appointment with [facility] by the end of 2023.       Start Date: 5/4/2023 Expected End Date: 12/31/2023    This Visit's Progress: 30% Recent Progress: 30%    Note:     Barriers: language barrier, low literacy, noncompliance, and lack of knowledge how to navigate complex health care system  Strengths: motivated to attend appt  Patient expressed understanding of goal: Yes    Action steps to achieve this goal:  1. I will answer my phone when I am contacted to schedule my appointment.  2. I will attend my initial appointment as scheduled with Anil on 7/13/23 at 12:30PM. ( Completed)  3. I will schedule a follow up appointment with a provider if it is recommended to do so while I am at the clinic.  4. I will follow up with CCC regarding this goal at each outreach until it is completed.     Note: 6/6/2023 - CCRN faxed over the order to [facility] at Fax: [phone] and fax: [phone].    [facility] [phone]  [address] [place] [id number]                          Intervention and Education during outreach:   Per CCRN delegation, CHW to outreach on 8/4 to follow up with patient post PCP appt. CHW to assist with [place] appt if PCP agrees to place a new referral to replace prosthetic leg.      Per chart review, PCP placed new referral on 8/3. CHW outreached to clinic staff Katelyn who will assist with faxing order to [facility] Fax: [phone] and fax: [phone] today.  CHW reviewed above with patient, new 8/3 order faxed today. CHW will assist with appointment as  "needed. CHW plans to consult with CCRN on Monday and will follow up with [facility] to ensure order is received.     Patient inquired about \"government housing\", she was told someone can help her. CHW reviewed that [facility] team can assist with providing resources. CHW inquired if patient has applied for public housing waiting list in the past. Patient replied no. Patient confirmed she does not have an [facility] worker.   CHW reviewed, will consult with CCRN regarding referral for housing worker and will follow up with her next week.      CHW Plan:  CHW to follow up in 1 month    Leta Epperson  [facility] Care Coordination  [facility]    Phone: [phone]    "

## 2023-08-14 NOTE — PROGRESS NOTES
Per consult with CCRN, okay to refer to [facility] for ARMHS and HSS worker. CCRN will assist patient with 8/3 orthotics and prosthetic referral at next outreach.     CHW spoke with patient through [facility]  ID [id number] ([place]). CHW reviewed above. Patient gives verbal permission for CHW to place referral to [facility] for ARMHS and HSS worker. Patient will watch for follow up call from CCRN.   CHW spoke with Adolph at [facility] [phone], referral sent to [email or web address].     Leta Epperson  Clinic Care Coordination  [facility]    Phone: [phone]

## 2023-08-23 ENCOUNTER — PATIENT OUTREACH (OUTPATIENT)
Dept: NURSING | Facility: CLINIC | Age: 33
End: 2023-08-23
Payer: COMMERCIAL

## 2023-08-23 NOTE — PROGRESS NOTES
Clinic Care Coordination Contact  Follow Up Progress Note      Assessment: CCRN spoke with patient today via phone. Confirmed that she would like the referral to be faxed over to [facility].    CCRN faxed over the order to [facility] at Fax: [phone] and fax: [phone].    [facility] [phone]     Plan: CHW/CCRN to follow up on appt status with next outreach.

## 2023-09-12 ENCOUNTER — PATIENT OUTREACH (OUTPATIENT)
Dept: CARE COORDINATION | Facility: CLINIC | Age: 33
End: 2023-09-12
Payer: COMMERCIAL

## 2023-09-12 ASSESSMENT — ACTIVITIES OF DAILY LIVING (ADL): DEPENDENT_IADLS:: INDEPENDENT

## 2023-09-12 NOTE — PROGRESS NOTES
Clinic Care Coordination Contact  Community Health Worker Follow Up  Spoke with patient through [facility]  ID [id number] ([place])    Care Gaps:     Health Maintenance Due   Topic Date Due    COVID-19 Vaccine (3 - Pfizer series) 12/04/2021    INFLUENZA VACCINE (1) 09/01/2023     Postponed to next outreach.      Care Plan:   Care Plan: Prosthetic       Problem: Left leg weakness       Goal: Patient would like to be scheduled and attend follow up appointment with Anil by the end of 2023.       Start Date: 5/4/2023 Expected End Date: 12/31/2023    This Visit's Progress: 40% Recent Progress: 30%    Priority: High    Note:     Barriers: language barrier, low literacy, noncompliance, and lack of knowledge how to navigate complex health care system  Strengths: motivated to attend appt  Patient expressed understanding of goal: Yes    Action steps to achieve this goal:  1. I will answer my phone when I am contacted to schedule my appointment.  2. I will attend my initial appointment as scheduled with Anil on 7/13/23 at 12:30PM. (Completed)    3. I will attend my follow up appointment as scheduled on Wed 9/20/23 at 10:30AM for prosthetic replacement on left AFO brace.v([facility] [phone])  3. I will schedule a follow up appointment with a provider if it is recommended to do so while I am at the clinic.  4. I will follow up with [facility] regarding this goal at each outreach until it is completed.     Note: 6/6/2023 - CCRN faxed over the order to [facility] at Fax: [phone] and fax: [phone].    [facility] [phone]  [address] [place] [address]                            Care Plan: ARMHS and HSS Worker       Problem: Housing       Goal: I will establish ARMHS and Housing Stabilization Services within the next 90 days.       Start Date: 8/14/2023 Expected End Date: 11/14/2023    This Visit's Progress: 10% Recent Progress: 10%    Priority: High    Note:     Barriers:  Language  Strengths: Willing to accept support.  Patient expressed understanding of goal: Yes     Action steps to achieve this goal:   1.  I will answer my phone when I am contacted by [facility] to schedule an initial diagnostic assessment.   2.  I will attend my [place] appointments so that I may be assigned an ARMHS Worker and Housing Stabilization .   3.  I will follow up with CCC in the next month regarding this goal.   Note: Referral to [place] sent on 8/14/23 by CHW.                           Problem: HP GENERAL PROBLEM                 Intervention and Education during outreach:   CHW inquired if [facility] called to schedule appointment for prosthetic replacement. Patient shares that clinic has not called and agreed to support with appointment. Conference called [facility], appointment scheduled on Wed 9/20/23 at 10:30AM for prosthetic replacement on left AFO brace. Patient requested transportation.   CHW called [facility] [phone], transportation confirmed with [facility] [phone].    CHW inquired if [place] called regarding referral for ARMHS and Housing Stabilization . Patient replied that she has not received call yet. CHW encourage patient to continue watching for call and CHW will follow up referrals today.   CHW spoke with Adolph at [facility] [phone], she did not received secure email with referral on 8/11/23. Adolph requested that CHW send again and she will follow up with patient today.   Referral sent again via secured email.     CHW Plan:  CHW to follow up in 1 month    Leta [facility] Care Coordination  [facility]    Phone: [phone]

## 2023-09-21 NOTE — TELEPHONE ENCOUNTER
Patient arrived ambulatory via POV. Patient c/o insect bite to left shoulder/neck. Patient denies fever/chills. RN cannot approve Refill Request    RN can NOT refill this medication PCP messaged that patient is overdue for Office Visit. Last office visit: Visit date not found Last Physical: Visit date not found Last MTM visit: Visit date not found Last visit same specialty: 4/1/2020 Isabel Mccall MD.  Next visit within 3 mo: Visit date not found  Next physical within 3 mo: Visit date not found      Vivienne Garcia, Care Connection Triage/Med Refill 6/5/2021    Requested Prescriptions   Pending Prescriptions Disp Refills     XULANE 150-35 mcg/24 hr [Pharmacy Med Name: XULANE PATCH (ORTHO-EVRA) 150-35 Patch] 3 patch 0     Sig: PLACE 1 PATCH ON THE SKIN EVERY 7 DAYS FOR 3 CONSECUTIVE WEEKS THEN REMOVE AND DO NOT REPLACE PATCH FOR 1 WEEK THEN RESTART CYCLE       Oral Contraceptives Protocol Failed - 6/5/2021  4:38 PM        Failed - Visit with PCP or prescribing provider visit in last 12 months      Last office visit with prescriber/PCP: Visit date not found OR same dept: Visit date not found OR same specialty: 4/1/2020 Isabel Mccall MD  Last physical: Visit date not found Last MTM visit: Visit date not found   Next visit within 3 mo: Visit date not found  Next physical within 3 mo: Visit date not found  Prescriber OR PCP: Tevin Ho MD  Last diagnosis associated with med order: 1. Encounter for initial prescription of transdermal patch hormonal contraceptive device  - XULANE 150-35 mcg/24 hr [Pharmacy Med Name: XULANE PATCH (ORTHO-EVRA) 150-35 Patch]; PLACE 1 PATCH ON THE SKIN EVERY 7 DAYS FOR 3 CONSECUTIVE WEEKS THEN REMOVE AND DO NOT REPLACE PATCH FOR 1 WEEK THEN RESTART CYCLE  Dispense: 3 patch; Refill: 0    If protocol passes may refill for 12 months if within 3 months of last provider visit (or a total of 15 months).

## 2023-10-05 ENCOUNTER — PATIENT OUTREACH (OUTPATIENT)
Dept: CARE COORDINATION | Facility: CLINIC | Age: 33
End: 2023-10-05
Payer: COMMERCIAL

## 2023-10-05 NOTE — PROGRESS NOTES
Clinic Care Coordination Contact  Care Coordination Clinician Chart Review    Situation: Patient chart reviewed by Care Coordinator.       Background: Care Coordination Program started: 4/26/2023. Initial assessment completed and patient-centered care plan(s) were developed with participation from patient. Lead CC handed patient off to CHW for continued outreaches.       Assessment: Per chart review, patient outreach completed by CC CHW on 9/12/2023.  Patient is actively working to accomplish goal(s). Patient's goal(s) appropriate and relevant at this time. Patient is not due for updated Plan of Care.  Assessments will be completed annually or as needed/with change of patient status. CHW to follow up with patient if she attended her appt on 9/20/2203 with [facility] and [facility] worker and HSS status with [facility].       Care Plan: Prosthetic       Problem: Left leg weakness       Goal: Patient would like to be scheduled and attend follow up appointment with Anil by the end of 2023.       Start Date: 5/4/2023 Expected End Date: 12/31/2023    This Visit's Progress: 40% Recent Progress: 30%    Priority: High    Note:     Barriers: language barrier, low literacy, noncompliance, and lack of knowledge how to navigate complex health care system  Strengths: motivated to attend appt  Patient expressed understanding of goal: Yes    Action steps to achieve this goal:  1. I will answer my phone when I am contacted to schedule my appointment.  2. I will attend my initial appointment as scheduled with Anil on 7/13/23 at 12:30PM. (Completed)    3. I will attend my follow up appointment as scheduled on Wed 9/20/23 at 10:30AM for prosthetic replacement on left AFO brace.v([employer] [phone])  3. I will schedule a follow up appointment with a provider if it is recommended to do so while I am at the clinic.  4. I will follow up with CCC regarding this goal at each outreach until it is completed.     Note: 6/6/2023 -  CABRERAN faxed over the order to [facility] at Fax: [phone] and fax: [phone].    [facility] [phone]  [address] [place] [address]                            Care Plan: ARMHS and HSS Worker       Problem: Housing       Goal: I will establish ARMHS and Housing Stabilization Services within the next 90 days.       Start Date: 8/14/2023 Expected End Date: 11/14/2023    This Visit's Progress: 10% Recent Progress: 10%    Priority: High    Note:     Barriers: Language  Strengths: Willing to accept support.  Patient expressed understanding of goal: Yes     Action steps to achieve this goal:   1.  I will answer my phone when I am contacted by [facility] to schedule an initial diagnostic assessment.   2.  I will attend my [place] appointments so that I may be assigned an ARMHS Worker and Housing Stabilization .   3.  I will follow up with CCC in the next month regarding this goal.   Note: Referral to [place] sent on 8/14/23 by CHW.                           Problem: HP GENERAL PROBLEM                        Plan/Recommendations: The patient will continue working with Care Coordination to achieve goal(s) as above. CHW will continue outreaches at minimum every 30 days and will involve Lead CC as needed or if patient is ready to move to Maintenance. Lead CC will continue to monitor CHW outreaches and patient's progress to goal(s) every 6 weeks.     Plan of Care updated and sent to patient: No

## 2023-10-05 NOTE — LETTER
[facility]  Patient Centered Plan of Care  About Me:        Patient Name:  Kushal Sommers    YOB: 1990  Age:         32 year old   Edy MRN:    [id number] Telephone Information:  Home Phone [phone]   Mobile [phone]   Mobile Not on file.       Address:  [address]  [place] [address] Email address:  No e-mail address on record      Emergency Contact(s)    Name Relationship Lgl Grd Work Phone Home Phone Mobile Phone   1. LUISITO JORGE Aunt    [phone]   2. LILY Aunt   [phone]            Primary language:  Tabby     needed? Yes   [facility] [employer] [facility]:  [phone] op. 1  Other communication barriers:Language barrier    Preferred Method of Communication:     Current living arrangement: I live in a private home with family    Mobility Status/ Medical Equipment: Independent w/Device        Health Maintenance  Health Maintenance Reviewed: Not assessed      My Access Plan  Medical Emergency 911   Primary Clinic Line [facility] [phone]   24 Hour Appointment Line [phone] or  [phone]) (toll-free)   24 Hour Nurse Line [phone] (toll-free)   Preferred Urgent Care [facility] [phone]     [facility]  [phone]     Preferred Pharmacy [facility] - [place], [place] - [address]     Behavioral Health Crisis Line The National Suicide Prevention Lifeline at 1-110.450.6061 or Text/Call 318             My Care Team Members  Patient Care Team         Relationship Specialty Notifications Start End    Isabel Mccall MD PCP - General Family Medicine  12/26/22     Merged    Phone: [phone] Fax: [phone]         [address] [place] [address]    Isabel Mccall MD Assigned PCP   8/13/22     Phone: [phone] Fax: [phone]         [address] [place] [address]    Rossi Pascal  MD ROXANNE  Family Practice  10/10/22     Merged    Phone: [phone] Fax: [phone]         [address] [place] [address]    Leta Epperson Community Health Worker Primary Care - CC Admissions 4/26/23     Gloria Shields, RN Lead Care Coordinator Primary Care - [facility] Admissions 4/27/23               My Care Plans  Self Management and Treatment Plan  Care Plan  Care Plan: Prosthetic       Problem: Left leg weakness       Goal: Patient would like to be scheduled and attend follow up appointment with Anil by the end of 2023.       Start Date: 5/4/2023 Expected End Date: 12/31/2023    Recent Progress: 40%    Priority: High    Note:     Barriers: language barrier, low literacy, noncompliance, and lack of knowledge how to navigate complex health care system  Strengths: motivated to attend appt  Patient expressed understanding of goal: Yes    Action steps to achieve this goal:  1. I will answer my phone when I am contacted to schedule my appointment.  2. I will attend my initial appointment as scheduled with Anil on 7/13/23 at 12:30PM. (Completed)  3. I will attend my follow up appointment as scheduled on Wed 9/20/23 at 10:30AM for prosthetic replacement on left AFO brace. ([employer] [phone]) (Completed)    4. I will attend my follow up appointment 12/7/23 at 8:40AM with [facility] Spaulding [facility]. (On cancellation wait list for sooner appt per clinic. 2 hour appt per pt, will need transportation)  5. I will schedule a follow up appointment with a provider if it is recommended to do so while I am at the clinic.  6. I will follow up with [facility] regarding this goal at each outreach until it is completed.     Note: 6/6/2023 - CCRN faxed over the order to [facility] at Fax: [phone] and fax: [phone].    [facility] [phone]  [address] [place] [address]                            Care Plan: ARMHS and HSS Worker       Problem: Housing        Goal: I will establish ARMHS and Housing Stabilization Services within the next 90 days.       Start Date: 8/14/2023 Expected End Date: 11/14/2023    This Visit's Progress: 10% Recent Progress: 10%    Priority: High    Note:     Barriers: Language  Strengths: Willing to accept support.  Patient expressed understanding of goal: Yes     Action steps to achieve this goal:   1.  I will answer my phone when I am contacted by [facility] to schedule an initial diagnostic assessment.   2.  I will attend my [place] appointments so that I may be assigned an ARMHS Worker and Housing Stabilization .   3.  I will follow up with [facility] in the next month regarding this goal.   Note: Referral to [place] sent on 8/14/23 by CHW.                           Problem: HP GENERAL PROBLEM                      Action Plans on File:                       Advance Care Plans/Directives Type:   No data recorded    My Medical and Care Information  Problem List   Patient Active Problem List   Diagnosis    Vitamin D Deficiency    Major Depression, Single Episode With Psychotic Features    Congenital Spinal Deformity    Spinal cord injury at T1-T6 level without injury of spinal bone (H)    Spinal deformity, congenital      Current Medications and Allergies:  See printed Medication Report.    Care Coordination Start Date: 4/26/2023   Frequency of Care Coordination: monthly     Form Last Updated: 10/19/2023

## 2023-10-13 ENCOUNTER — PATIENT OUTREACH (OUTPATIENT)
Dept: CARE COORDINATION | Facility: CLINIC | Age: 33
End: 2023-10-13
Payer: COMMERCIAL

## 2023-10-13 ASSESSMENT — ACTIVITIES OF DAILY LIVING (ADL): DEPENDENT_IADLS:: INDEPENDENT

## 2023-10-13 NOTE — PROGRESS NOTES
Clinic Care Coordination Contact  Community Health Worker Follow Up  Spoke with patient through Tabby  ID [id number] (Estelita)    Care Gaps:     Health Maintenance Due   Topic Date Due    INFLUENZA VACCINE (1) 09/01/2023    COVID-19 Vaccine (3 - 2023-24 season) 09/01/2023    PHQ-9  10/26/2023     Postponed to next outreach.      Care Plan:   Care Plan: Prosthetic       Problem: Left leg weakness       Goal: Patient would like to be scheduled and attend follow up appointment with Anil by the end of 2023.       Start Date: 5/4/2023 Expected End Date: 12/31/2023    Recent Progress: 40%    Priority: High    Note:     Barriers: language barrier, low literacy, noncompliance, and lack of knowledge how to navigate complex health care system  Strengths: motivated to attend appt  Patient expressed understanding of goal: Yes    Action steps to achieve this goal:  1. I will answer my phone when I am contacted to schedule my appointment.  2. I will attend my initial appointment as scheduled with Anil on 7/13/23 at 12:30PM. (Completed)  3. I will attend my follow up appointment as scheduled on Wed 9/20/23 at 10:30AM for prosthetic replacement on left AFO brace. ([employer] [phone]) (Completed)    4. I will attend my follow up appointment 12/7/23 at 8:40AM with [facility] Spaulding at [facility]. (On cancellation wait list for sooner appt per clinic. 2 hour appt per pt, will need transportation)  5. I will schedule a follow up appointment with a provider if it is recommended to do so while I am at the clinic.  6. I will follow up with [facility] regarding this goal at each outreach until it is completed.     Note: 6/6/2023 - CCRN faxed over the order to [facility] at Fax: [phone] and fax: [phone].    [facility] [phone]  [address] [place] [address]                            Care Plan: ARMHS and HSS Worker       Problem: Housing       Goal: I will  establish ARMHS and Housing Stabilization Services within the next 90 days.       Start Date: 8/14/2023 Expected End Date: 11/14/2023    This Visit's Progress: 10% Recent Progress: 10%    Priority: High    Note:     Barriers: Language  Strengths: Willing to accept support.  Patient expressed understanding of goal: Yes     Action steps to achieve this goal:   1.  I will answer my phone when I am contacted by [facility] to schedule an initial diagnostic assessment.   2.  I will attend my [place] appointments so that I may be assigned an ARMHS Worker and Housing Stabilization .   3.  I will follow up with [facility] in the next month regarding this goal.   Note: Referral to [place] sent on 8/14/23 by CHW.                           Problem: HP GENERAL PROBLEM                 Intervention and Education during outreach:   Patient confirmed she completed appointment on 9/20/23 at 10:30AM for prosthetic replacement on left AFO brace. Patient shares that the provider wasn't sure how to help her so they rescheduled her on 12/27. Patient was told appointment will take two hours.     CHW inquired about referral for ARMHS and HSS worker at [facility]. Patient has not received follow up yet. Informed patient, CHW will follow up on referral.   CHW spoke with Adolph at [facility] [phone], states she might have misplaced referral and asked that CHW fax it again. Informed Adolph, CHW will fax referral when in clinic on Wed next week.     CHW spoke with Marce at [facility] [place] patient was last seen 9/20 and referred to [facility], appointment scheduled on 12/7/23 at 8:40AM with Todd Spaulding. Per patients request, she's on cancellation list for sooner appointment. Marce explained that it's possible provider at 9/20 visit couldn't do the molding so patient was referred to Dr. Maxi Newby.   CHW called and spoke with patient through Tabby drewer (René),  reviewed above, appt is on 12/7 not 12/27. Patient expressed understanding.     CHW Plan:  CHW to follow up in 1 month    Leta [facility] Care Coordination  [facility]    Phone: [phone]

## 2023-10-18 NOTE — PROGRESS NOTES
Online referral submitted to [employer] for ARMHS and HHS worker services. [email or web address]    Leta Epperson  Clinic Care Coordination  [facility]    Phone: [phone]

## 2023-11-14 ENCOUNTER — PATIENT OUTREACH (OUTPATIENT)
Dept: CARE COORDINATION | Facility: CLINIC | Age: 33
End: 2023-11-14
Payer: COMMERCIAL

## 2023-11-14 ASSESSMENT — ACTIVITIES OF DAILY LIVING (ADL): DEPENDENT_IADLS:: INDEPENDENT

## 2023-11-14 NOTE — PROGRESS NOTES
Clinic Care Coordination Contact  Community Health Worker Follow Up  Spoke with patient through Tabby  ID (Way)    Care Gaps:     Health Maintenance Due   Topic Date Due    INFLUENZA VACCINE (1) 09/01/2023    COVID-19 Vaccine (3 - 2023-24 season) 09/01/2023    PHQ-9  10/26/2023     Patient declined.     Care Plan:   Care Plan: Prosthetic       Problem: Left leg weakness       Goal: Patient would like to be scheduled and attend follow up appointment with Anil by the end of 2023.       Start Date: 5/4/2023 Expected End Date: 12/31/2023    This Visit's Progress: 40% Recent Progress: 40%    Priority: High    Note:     Barriers: language barrier, low literacy, noncompliance, and lack of knowledge how to navigate complex health care system  Strengths: motivated to attend appt  Patient expressed understanding of goal: Yes    Action steps to achieve this goal:  1. I will answer my phone when I am contacted to schedule my appointment.  2. I will attend my initial appointment as scheduled with Anil on 7/13/23 at 12:30PM. (Completed)  3. I will attend my follow up appointment as scheduled on Wed 9/20/23 at 10:30AM for prosthetic replacement on left AFO brace. ([employer] [phone]) (Completed)    4. I will attend my follow up appointment 12/7/23 at 8:40AM with Todd Spaulding at [facility]. ([employer] Ride [phone], 2 passengers, return ride home at 11:30AM)  5. I will schedule a follow up appointment with a provider if it is recommended to do so while I am at the clinic.  6. I will follow up with [facility] regarding this goal at each outreach until it is completed.     Note: 6/6/2023 - CCRN faxed over the order to [facility] at Fax: [phone] and fax: [phone].    [facility] [phone]  [address] [place] [address]                            Care Plan: ARMHS and HSS Worker Completed 11/14/2023      Problem: Housing  Resolved 11/14/2023       Goal: I will establish ARMHS and Housing Stabilization Services within the next 90 days.  Completed 11/14/2023      Start Date: 8/14/2023 Expected End Date: 11/14/2023    Recent Progress: 10%    Priority: High    Note:     Barriers: Language  Strengths: Willing to accept support.  Patient expressed understanding of goal: Yes     Action steps to achieve this goal:   1.  I will answer my phone when I am contacted by [facility] to schedule an initial diagnostic assessment.   2.  I will attend my [place] appointments so that I may be assigned an ARMHS Worker and Housing Stabilization .   3.  I will follow up with [facility] in the next month regarding this goal.   Note: Referral to [place] sent on 8/14/23 by CHW.                           Problem: HP GENERAL PROBLEM  Resolved 11/14/2023                Intervention and Education during outreach:   CHW reminded patient of appointment on 12/7/23 at 8:40AM with Todd Spaulding at [facility]. CHW to assist with transportation.   CHW called [employer] [facility] [phone], transportation confirmed with [employer] [phone], for two passengers, return call ride home scheduled for 11:30AM.     Email received on 10/31/23 at 11:07AM: Good afternoon Leta,   I just received an update from our intake team about the patient you recently referred to us, on the 18th of October. Our staff contacted her via phone and was told by the patient that she only wants to apply for public housing, and stated she isn't ready to apply for ARM yet, that she wants to do that next year.   Is there anything else you would like us to do for this patient? We did not complete an intake since the patient declined services.   Thanks,  Venkatesh Paynear   [phone]  [email or web address]  CHW reviewed above. Patient confirmed, she no longer needs housing resources at this time.     CHW Plan:  CHW to follow up in 1 month    Leta  Zhou  Clinic Care Coordination  [facility]    Phone: [phone]

## 2023-11-17 ENCOUNTER — PATIENT OUTREACH (OUTPATIENT)
Dept: CARE COORDINATION | Facility: CLINIC | Age: 33
End: 2023-11-17
Payer: COMMERCIAL

## 2023-11-24 NOTE — PROGRESS NOTES
Clinic Care Coordination Contact  Care Coordination Clinician Chart Review    Situation: Patient chart reviewed by Care Coordinator.       Background: Care Coordination Program started: 4/26/2023. Initial assessment completed and patient-centered care plan(s) were developed with participation from patient. Lead CC handed patient off to CHW for continued outreaches.       Assessment: Per chart review, patient outreach completed by CC CHW on 11/14/23.  Patient is actively working to accomplish goal(s). Patient's goal(s) appropriate and relevant at this time. Patient is not due for updated Plan of Care.  Assessments will be completed annually or as needed/with change of patient status.      Care Plan: Prosthetic       Problem: Left leg weakness       Goal: Patient would like to be scheduled and attend follow up appointment with Anil by the end of 2023.       Start Date: 5/4/2023 Expected End Date: 12/31/2023    This Visit's Progress: 40% Recent Progress: 40%    Priority: High    Note:     Barriers: language barrier, low literacy, noncompliance, and lack of knowledge how to navigate complex health care system  Strengths: motivated to attend appt  Patient expressed understanding of goal: Yes    Action steps to achieve this goal:  1. I will answer my phone when I am contacted to schedule my appointment.  2. I will attend my initial appointment as scheduled with Anil on 7/13/23 at 12:30PM. (Completed)  3. I will attend my follow up appointment as scheduled on Wed 9/20/23 at 10:30AM for prosthetic replacement on left AFO brace. ([employer] [phone]) (Completed)    4. I will attend my follow up appointment 12/7/23 at 8:40AM with [facility] Spaulding at [facility]. ([employer] [facility] [phone], 2 passengers, return ride home at 11:30AM)  5. I will schedule a follow up appointment with a provider if it is recommended to do so while I am at the clinic.  6. I will follow up with [facility]  regarding this goal at each outreach until it is completed.     Note: 6/6/2023 - CCRN faxed over the order to [facility] at Fax: [phone] and fax: [phone].    [facility] [phone]  [address] [place] [address]                                 Plan/Recommendations: The patient will continue working with Care Coordination to achieve goal(s) as above. CHW will continue outreaches at minimum every 30 days and will involve Lead CC as needed or if patient is ready to move to Maintenance. Lead CC will continue to monitor CHW outreaches and patient's progress to goal(s) every 6 weeks.     Plan of Care updated and sent to patient: No

## 2023-12-14 ENCOUNTER — PATIENT OUTREACH (OUTPATIENT)
Dept: CARE COORDINATION | Facility: CLINIC | Age: 33
End: 2023-12-14
Payer: COMMERCIAL

## 2023-12-14 NOTE — PROGRESS NOTES
Clinic Care Coordination Contact  [facility]       Clinical Data: CHW Outreach    Outreach attempted x 1. Loulou Spoke with Patient ( Kushal Vega).  Patient expressed that she is not able to follow up at this moment. Further requesting to call CHW at a later time. CHW acknowledged and provided call back information and requested return call.    Plan: CHW will make another attempt to reach the patient via phone or MyChart.    CHW outreach in the next 2 weeks.      KANA Randall  Clinic Care Coordination   [facility]   Phone: [phone]  [email or web address]

## 2023-12-25 NOTE — PROGRESS NOTES
Clinic Care Coordination Contact    CCRN again wasn't able to reach patient via phone x3 today. Phone kept ringing and unable to reach VM.       Plan: CCRN plans outreach to patient last attempt on 1/23/2023 at 11am.     
Alert and oriented to person, place and time

## 2024-01-05 ENCOUNTER — PATIENT OUTREACH (OUTPATIENT)
Dept: CARE COORDINATION | Facility: CLINIC | Age: 34
End: 2024-01-05
Payer: COMMERCIAL

## 2024-01-05 NOTE — PROGRESS NOTES
Clinic Care Coordination Contact  Follow Up Progress Note      Assessment: CCRN spoke with patient today via phone to follow up on leg brace and strap. Patient states she did receive a new leg brace but she doesn't like it so she will continue to use the old one. Patient declined to schedule a follow up with ortho stating that she no longer want to pursue or fix it. No further assist needed from care coordination. Patient will call the clinic if need needs arise.     Plan: Patient is graduated from care coordination today.

## 2024-03-12 ENCOUNTER — PATIENT OUTREACH (OUTPATIENT)
Dept: CARE COORDINATION | Facility: CLINIC | Age: 34
End: 2024-03-12
Payer: COMMERCIAL

## 2024-03-12 ENCOUNTER — TELEPHONE (OUTPATIENT)
Dept: FAMILY MEDICINE | Facility: CLINIC | Age: 34
End: 2024-03-12

## 2024-03-12 NOTE — PROGRESS NOTES
As tolerated   Clinic Care Coordination Contact    Situation: Patient chart reviewed by care coordinator.    Background: Received a message from clinic staff.     Assessment: Pt asking to get an appt with Gloria for public Housing ppwk. See telephone encounter from today.  New CC episode created.     Plan/Recommendations: CHW to reach out to patient and schedule appt with SW.

## 2024-03-12 NOTE — TELEPHONE ENCOUNTER
Reason for Call:  Appointment Request    Patient requesting this type of appt:  CCC RN     Requested provider: Gloria    Reason patient unable to be scheduled: Appointment requires orders that are not found in chart    When does patient want to be seen/preferred time: 3-7 days    Comments: Pt asking to get an appt with Gloria for public Housing ppwk    Okay to leave a detailed message?: Yes at Home number on file [phone] (home)    Call taken on 3/12/2024 at 8:56 AM by Kathy Loza

## 2024-03-12 NOTE — PROGRESS NOTES
Clinic Care Coordination Contact  Community Health Worker Initial Outreach  Spoke with patient through Tabby  [phone]    CHW Initial Information Gathering:  Referral Source: Self-patient/Caregiver  Preferred Hospital: [facility]  [phone]  Preferred Urgent Care: [facility], [phone]  Current living arrangement:: I live in a private home with family  Type of residence:: Apartment  Community Resources: [employer]  Equipment Currently Used at Home: cane, straight (Left prothetic leg)  Informal Support system:: Family, Children  Transportation means:: Medical transport  CHW Additional Questions  If ED/Hospital discharge, follow-up appointment scheduled as recommended?: N/A  Medication changes made following ED/Hospital discharge?: N/A  MyChart active?: No  Patient agreeable to assistance with activating MyChart?: No    Chart Review: Referral from Self-patient/Caregiver  Reason for Referral: Needs support with public housing paperwork.     Patient accepts CC: Yes. Patient scheduled for assessment with SHARON Barrientos  on 3/19/23 at 1PM. Patient noted desire to discuss support needed with filling out public housing application. She received paperwork two days ago and declined sooner appointment due to transportation.     Patient requested transportation to appointment and confirmed she will come alone. CHW called [employer] [phone], ride confirmed with [employer] [phone].    Leta Epperson  Clinic Care Coordination  [facility]    Phone: [phone]

## 2024-03-12 NOTE — TELEPHONE ENCOUNTER
I created new CC episode and assigned you the referral. Please schedule with SW to assist with paperwork. Thanks

## 2024-03-19 ENCOUNTER — ALLIED HEALTH/NURSE VISIT (OUTPATIENT)
Dept: NURSING | Facility: CLINIC | Age: 34
End: 2024-03-19
Payer: COMMERCIAL

## 2024-03-19 DIAGNOSIS — Z71.89 COMPLEX CARE COORDINATION: Primary | ICD-10-CM

## 2024-03-19 PROCEDURE — 99207 PR NO CHARGE LOS: CPT

## 2024-03-19 ASSESSMENT — ACTIVITIES OF DAILY LIVING (ADL): DEPENDENT_IADLS:: INDEPENDENT

## 2024-03-19 NOTE — PROGRESS NOTES
Clinic Care Coordination Contact  Clinic Care Coordination Contact  OUTREACH    Referral Information:  Referral Source: Self-patient/Caregiver         Chief Complaint   Patient presents with    Clinic Care Coordination - Initial    Clinic Care Coordination - Follow-up        Universal Utilization: appropriate  Clinic Utilization  Difficulty keeping appointments:: No  Compliance Concerns: No  No-Show Concerns: No  No PCP office visit in Past Year: Yes  Utilization      No Show Count (past year)  4             ED Visits  0             Hospital Admissions  0                    Current as of: 3/19/2024 11:44 AM                Clinical Concerns:  Current Medical Concerns:  none    Current Behavioral Concerns: none    Education Provided to patient: CCC education, support and resources   Pain  Pain (GOAL):: No  Health Maintenance Reviewed: Due/Overdue   Health Maintenance Due   Topic Date Due    COVID-19 Vaccine (3 - 2023-24 season) 09/01/2023    PHQ-9  10/26/2023    DTAP/TDAP/TD IMMUNIZATION (5 - Td or Tdap) 12/02/2023      Clinical Pathway: None    Medication Management:  Medication review status: Medications reviewed and no changes reported per patient.             Functional Status:  Dependent ADLs:: Independent  Dependent IADLs:: Independent  Bed or wheelchair confined:: No  Mobility Status: Independent  Fallen 2 or more times in the past year?: No  Any fall with injury in the past year?: No    Living Situation:  Current living arrangement:: I live in a private home with family  Type of residence:: Apartment    Lifestyle & Psychosocial Needs:    Social Determinants of Health     Food Insecurity: Low Risk  (3/19/2024)    Food Insecurity     Within the past 12 months, did you worry that your food would run out before you got money to buy more?: No     Within the past 12 months, did the food you bought just not last and you didn t have money to get more?: No   Depression: Not at risk (4/26/2023)    PHQ-2     PHQ-2 Score:  0   Housing Stability: Low Risk  (3/19/2024)    Housing Stability     Do you have housing? : Yes     Are you worried about losing your housing?: No   Tobacco Use: Low Risk  (8/3/2023)    Patient History     Smoking Tobacco Use: Never     Smokeless Tobacco Use: Never     Passive Exposure: Never   Financial Resource Strain: Low Risk  (5/8/2023)    Overall Financial Resource Strain (CARDIA)     Difficulty of Paying Living Expenses: Not hard at all   Alcohol Use: Not on file   Transportation Needs: Unmet Transportation Needs (5/8/2023)    PRAPARE - Transportation     Lack of Transportation (Medical): Yes     Lack of Transportation (Non-Medical): Yes   Physical Activity: Inactive (5/8/2023)    Exercise Vital Sign     Days of Exercise per Week: 0 days     Minutes of Exercise per Session: 0 min   Interpersonal Safety: Not on file   Stress: Stress Concern Present (3/19/2024)    [employer] - Occupational Stress Questionnaire     Feeling of Stress : To some extent   Social Connections: Not on file     Diet:: Regular  Inadequate nutrition (GOAL):: No  Tube Feeding: No  Inadequate activity/exercise (GOAL):: No  Significant changes in sleep pattern (GOAL): No  Transportation means:: Medical transport     Methodist or spiritual beliefs that impact treatment:: No  Mental health DX:: No  Mental health management concern (GOAL):: No  Chemical Dependency Status: No Current Concerns  Informal Support system:: Family, Children      Resources and Interventions:  Current Resources:      Community Resources: [place] Programs (SNAP)  Supplies Currently Used at Home: None  Equipment Currently Used at Home: cane, straight (Left prothetic leg)  Employment Status: employed full-time              Referrals Placed: None       Care Plan:      Patient/Caregiver understanding: yes    Outreach Frequency: monthly, more frequently as needed      Plan: CCSW, pt and Tabby  completed public housing application. Pt  was chose on the wait list and has a meeting set up on April 1st. CCSW read pt her rights and expectations that came with [facility] application. After completing application, pt brought it back home with her to gather needed verifications as well  present all the materials at her April 1st meeting. Pt denied further needs at this time.       OSCAR Barrientos, [facility]  Social Work Care Coordinator

## 2024-04-05 ENCOUNTER — TELEPHONE (OUTPATIENT)
Dept: FAMILY MEDICINE | Facility: CLINIC | Age: 34
End: 2024-04-05
Payer: COMMERCIAL

## 2024-04-05 NOTE — TELEPHONE ENCOUNTER
Forms/Letter Request    Type of form/letter: OTHER: Disability Certification form       Do we have the form/letter: Yes: TH    Who is the form from? [facility] (if other please explain)    Where did/will the form come from? form was faxed in    When is form/letter needed by: asap    How would you like the form/letter returned: Fax : [phone]

## 2024-04-11 ENCOUNTER — PATIENT OUTREACH (OUTPATIENT)
Dept: CARE COORDINATION | Facility: CLINIC | Age: 34
End: 2024-04-11
Payer: COMMERCIAL

## 2024-04-11 ASSESSMENT — ACTIVITIES OF DAILY LIVING (ADL): DEPENDENT_IADLS:: INDEPENDENT

## 2024-04-11 NOTE — PROGRESS NOTES
Clinic Care Coordination Contact  [facility]/Avelino Mcnair  ID (Law)    Clinical Data: Care Coordinator Outreach    Outreach Documentation Number of Outreach Attempt   4/11/2024   2:37 PM 1     CHW spoke with patient, states she's at work and would like CHW to call her in the morning.    Plan: Care Coordinator will try to reach patient again in 1-2 business day.    Leta Epperson  [facility] Care Coordination  [facility]    Phone: [phone]

## 2024-04-15 ENCOUNTER — PATIENT OUTREACH (OUTPATIENT)
Dept: CARE COORDINATION | Facility: CLINIC | Age: 34
End: 2024-04-15
Payer: COMMERCIAL

## 2024-04-15 ASSESSMENT — ACTIVITIES OF DAILY LIVING (ADL): DEPENDENT_IADLS:: INDEPENDENT

## 2024-04-15 NOTE — PROGRESS NOTES
Clinic Care Coordination Contact  Community Health Worker Follow Up  Spoke with patient through Tabby  ID (Luis Miguel, ee)    Care Gaps:     Health Maintenance Due   Topic Date Due    COVID-19 Vaccine (3 - 2023-24 season) 09/01/2023    PHQ-9  10/26/2023    DTAP/TDAP/TD IMMUNIZATION (5 - Td or Tdap) 12/02/2023     Postponed to next outreach.      Care Plan:   Care Plan: Community Resources       Problem: Public Housing Agency       Goal: I want to complete public housing application before my April 1st interview with [facility].       Start Date: 4/15/2024 Expected End Date: 10/15/2024    This Visit's Progress: 50%    Priority: High    Note:     Barriers: Language  Strengths: Accepting of support. Patient chosen on [facility] wait list.  Patient expressed understanding of goal: Yes    Action steps to achieve this goal:  1. I will meet with CCSW as scheduled on 3/19/24 at 10AM, for support with completing housing application prior to my April 1st interview with [facility]. (Completed)  2. I will provide all necessary documentation and information to complete my interview on 4/1/24 with [facility]. (Completed)  3. I will monitor my incoming mail and answer my phone as I receive mail and calls from [facility] over the next 6-8 weeks.   4. I will follow up with CCC in the next month regarding this goal.    Note: Patient completed 4/1 interview with BASIA Quinn.   Second Contact: MUSHTAQ Anna [phone]                            Intervention and Education during outreach:   CHW inquired about 4/1st interview and if any update from [facility] on next step with housing application. Patient states, she was told they need her doctor to fill out some forms, not sure if this was done. CHW inquired if okay to call her contact at [facility]. Patient agreed and shared contact for Cristel Luque at [facility] [phone].    Conference called Cristel, call transfer to [place] who completed interview on 4/1 with patient. Cheyenne states, they received documents from PCP  already, the only thing they needs is pay stub from Feb 16, due on 4/22.   Patient shares that she already emailed pay stub on 4/5 however she will email it again for record today. Patient will continue to watch for update by mail or phone from [facility]. Patient will notify CCC team if she needs any support.     CHW Plan: CHW to follow up in 1 month    [facility] Care Coordination  [facility]    Phone: [phone]

## 2024-05-13 ENCOUNTER — PATIENT OUTREACH (OUTPATIENT)
Dept: CARE COORDINATION | Facility: CLINIC | Age: 34
End: 2024-05-13
Payer: COMMERCIAL

## 2024-05-13 NOTE — PROGRESS NOTES
Clinic Care Coordination Contact  Care Coordination Clinician Chart Review    Situation: Patient chart reviewed by Care Coordinator.       Background: Care Coordination Program started: 3/12/2024. Initial assessment completed and patient-centered care plan(s) were developed with participation from patient. Lead CC handed patient off to CHW for continued outreaches.       Assessment: Per chart review, patient outreach completed by CC CHW on 4/15.  Patient is actively working to accomplish goal(s). Patient's goal(s) appropriate and relevant at this time. Patient is not due for updated Plan of Care.  Assessments will be completed annually or as needed/with change of patient status.      Care Plan: Community Resources       Problem: Public Housing Agency       Goal: I want to complete public housing application before my April 1st interview with [facility].       Start Date: 4/15/2024 Expected End Date: 10/15/2024    This Visit's Progress: 50%    Priority: High    Note:     Barriers: Language  Strengths: Accepting of support. Patient chosen on [facility] wait list.  Patient expressed understanding of goal: Yes    Action steps to achieve this goal:  1. I will meet with CCSW as scheduled on 3/19/24 at 10AM, for support with completing housing application prior to my April 1st interview with [facility]. (Completed)  2. I will provide all necessary documentation and information to complete my interview on 4/1/24 with [facility]. (Completed)  3. I will monitor my incoming mail and answer my phone as I receive mail and calls from [facility] over the next 6-8 weeks.   4. I will follow up with [facility] in the next month regarding this goal.    Note: Patient completed 4/1 interview with BASIA Quinn.   Second Contact: Cristel Hines, [place] [phone]                                   Plan/Recommendations: The patient will continue working with Care Coordination to achieve goal(s) as above. CHW will continue outreaches at minimum every 30 days and will involve Lead CC  as needed or if patient is ready to move to Maintenance. Lead CC will continue to monitor CHW outreaches and patient's progress to goal(s) every 6 weeks.     Plan of Care updated and sent to patient: OSCAR Botello, [facility]  Social Work Care Coordinator

## 2024-05-20 ENCOUNTER — PATIENT OUTREACH (OUTPATIENT)
Dept: CARE COORDINATION | Facility: CLINIC | Age: 34
End: 2024-05-20
Payer: COMMERCIAL

## 2024-05-20 ASSESSMENT — ACTIVITIES OF DAILY LIVING (ADL): DEPENDENT_IADLS:: INDEPENDENT

## 2024-05-20 NOTE — PROGRESS NOTES
Clinic Care Coordination Contact  Community Health Worker Follow Up  Spoke with patient through Tabby  ID Lay     Care Gaps:     Health Maintenance Due   Topic Date Due    COVID-19 Vaccine (3 - 2023-24 season) 09/01/2023    PHQ-9  10/26/2023    DTAP/TDAP/TD IMMUNIZATION (5 - Td or Tdap) 12/02/2023     Postponed to next outreach.      Care Plan:   Care Plan: Community Resources       Problem: Public Housing Agency       Goal: I want to complete public housing application before my April 1st interview with [facility].       Start Date: 4/15/2024 Expected End Date: 10/15/2024    This Visit's Progress: 70% Recent Progress: 50%    Priority: High    Note:     Barriers: Language  Strengths: Accepting of support. Patient chosen on [facility] wait list.  Patient expressed understanding of goal: Yes    Action steps to achieve this goal:  1. I will meet with CCSW as scheduled on 3/19/24 at 10AM, for support with completing housing application prior to my April 1st interview with [facility]. (Completed)  2. I will provide all necessary documentation and information to complete my interview on 4/1/24 with [facility]. (Completed)  3. I will monitor my incoming mail and answer my phone as I receive mail and calls from [facility] over the next 6-8 weeks.   4. I will follow up with CCC in the next month regarding this goal.    Note: Patient completed 4/1 interview with BASIA Quinn.   Second Contact: MUSHTAQ Anna [phone]                            Intervention and Education during outreach:   CHW inquired about any updates with [facility]. Patient shares that they did call her and she has an apartment showing tomorrow. CHW expressed appreciation for the update and encouraged her to call clinic or CCC team if any additional forms are needed from PCP. Patient express understanding.     CHW Plan: CHW to follow up in 1 month    Leta Epperson  [facility] Care Coordination  [facility]    Phone: [phone]

## 2024-05-28 ENCOUNTER — PATIENT OUTREACH (OUTPATIENT)
Dept: CARE COORDINATION | Facility: CLINIC | Age: 34
End: 2024-05-28
Payer: COMMERCIAL

## 2024-05-28 ASSESSMENT — ACTIVITIES OF DAILY LIVING (ADL)
DEPENDENT_IADLS:: INDEPENDENT
DEPENDENT_IADLS:: INDEPENDENT

## 2024-05-28 NOTE — PROGRESS NOTES
Clinic Care Coordination Contact  Community Health Worker Follow Up  Tabby  ID [id number]     Intervention and Education during outreach:   Patient called, states she went to [facility] apartment showing on 5/21 and found out that the person who provides  for her would be unable to provide services if she moves there.   Patient requested [facility] to switch her back to housing wait list [place] the [place] [address]. It's possible [facility] might have availability and wants her to fill out new form that PCP might need to sign, due no later than 6/11/24. Patient is requesting appointment with CCSW for support.   Soonest in-person appointment scheduled on 6/4/24 at 9AM with CCSW. Patient confirmed, using own ride.     CHW Plan: CHW to follow up in 1 month. Routing to lead clinician CCSW for review.     Leta Epperson  Clinic Care Coordination  [facility]    Phone: [phone]

## 2024-06-04 ENCOUNTER — VIRTUAL VISIT (OUTPATIENT)
Dept: INTERPRETER SERVICES | Facility: CLINIC | Age: 34
End: 2024-06-04

## 2024-06-04 ENCOUNTER — ALLIED HEALTH/NURSE VISIT (OUTPATIENT)
Dept: NURSING | Facility: CLINIC | Age: 34
End: 2024-06-04
Payer: COMMERCIAL

## 2024-06-04 DIAGNOSIS — Z71.89 COMPLEX CARE COORDINATION: Primary | ICD-10-CM

## 2024-06-04 PROCEDURE — 99207 PR NO CHARGE LOS: CPT

## 2024-06-04 PROCEDURE — T1013 SIGN LANG/ORAL INTERPRETER: HCPCS | Mod: U4 | Performed by: INTERPRETER

## 2024-06-04 NOTE — PROGRESS NOTES
Clinic Care Coordination Contact  Follow Up Progress Note      Assessment: pt in with paperwork from [facility]    Care Gaps:    Health Maintenance Due   Topic Date Due    COVID-19 Vaccine (3 - 2023-24 season) 09/01/2023    PHQ-9  10/26/2023    DTAP/TDAP/TD IMMUNIZATION (5 - Td or Tdap) 12/02/2023       Declined due to schedule     Care Plans  Care Plan: Community Resources       Problem: Public Housing Agency       Goal: I want to complete public housing application before my April 1st interview with [facility].       Start Date: 4/15/2024 Expected End Date: 10/15/2024    This Visit's Progress: 70% Recent Progress: 50%    Priority: High    Note:     Barriers: Language  Strengths: Accepting of support. Patient chosen on [facility] wait list.  Patient expressed understanding of goal: Yes    Action steps to achieve this goal:  1. I will meet with CCSW as scheduled on 3/19/24 at 10AM, for support with completing housing application prior to my April 1st interview with [facility]. (Completed)  2. I will provide all necessary documentation and information to complete my interview on 4/1/24 with [facility]. (Completed)  3. I will monitor my incoming mail and answer my phone as I receive mail and calls from [facility] over the next 6-8 weeks.   4. I will follow up with [facility] in the next month regarding this goal.    Note: Patient completed 4/1 interview with BASIA Quinn.   Second Contact: Cristel Hines, [place] [phone]                                Intervention/Education provided during outreach: CCSW and pt completed paperwork              Plan:   CCSW, pt and Tabby Worley completed disability accomodation documents for public housing. 2 page of form needs to be completed by physician. CCSW reached out to covering PCP as pt's PCP is currently out. Will fax form to [facility] once completed.       Care Coordinator will follow up in 30 days      Nely Castellanos, MSW, LGSW  Social Work Care Coordinator

## 2024-06-24 ENCOUNTER — PATIENT OUTREACH (OUTPATIENT)
Dept: CARE COORDINATION | Facility: CLINIC | Age: 34
End: 2024-06-24
Payer: COMMERCIAL

## 2024-06-24 NOTE — LETTER
[facility]  Patient Centered Plan of Care  About Me:        Patient Name:  Kushal Sommres    YOB: 1990  Age:         33 year old   [place] MRN:    [id number] Telephone Information:  Home Phone [phone]   Mobile [phone]   Mobile Not on file.       Address:  [address]  [place] [address] Email address:  No e-mail address on record      Emergency Contact(s)    Name Relationship Lgl Grd Work Phone Home Phone Mobile Phone   1. LUISITO JORGE Aunt    [phone]   2. LILY Aunt   [phone]            Primary language:  Tabby     needed? Yes   [facility]:  [phone] op. 1  Other communication barriers:Language barrier    Preferred Method of Communication:     Current living arrangement: I live in a private home with family    Mobility Status/ Medical Equipment: Independent        Health Maintenance  Health Maintenance Reviewed: Due/Overdue   Health Maintenance Due   Topic Date Due    COVID-19 Vaccine (3 - 2023-24 season) 09/01/2023    PHQ-9  10/26/2023    DTAP/TDAP/TD IMMUNIZATION (5 - Td or Tdap) 12/02/2023           My Access Plan  Medical Emergency 911   Primary Clinic Line [facility] [phone]   24 Hour Appointment Line [phone] or  [phone]) (toll-free)   24 Hour Nurse Line [phone] (toll-free)   Preferred Urgent Care [facility] [phone]     [facility]  [phone]     Preferred Pharmacy [facility] - [place], [place] - [address]     Behavioral Health Crisis Line The National Suicide Prevention Lifeline at 1-534.488.8740 or Text/Call 958           My Care Team Members  Patient Care Team         Relationship Specialty Notifications Start End    Isabel Mccall MD PCP - General Family Medicine  12/26/22     Merged    Phone: [phone] Fax: [phone]         [address] [place] [address] [address] [place]  [place] [address]    Isabel Mccall MD Assigned PCP   8/13/22     Phone: [phone] Fax: [phone]         [address] [place] [address] [place] [address]    Leta Epperson CHW Community Health Worker Primary Care - CC Admissions 3/12/24     Phone: [phone]         Nely Castellanos LGSW Lead Care Coordinator  Admissions 3/12/24                 My Care Plans  Self Management and Treatment Plan    Care Plan  Care Plan: Community Resources       Problem: Public Housing Agency       Goal: I want to complete public housing application before my April 1st interview with [facility].       Start Date: 4/15/2024 Expected End Date: 10/15/2024    This Visit's Progress: 90% Recent Progress: 70%    Priority: High    Note:     Barriers: Language  Strengths: Accepting of support. Patient chosen on [facility] wait list.  Patient expressed understanding of goal: Yes    Action steps to achieve this goal:  1. I will meet with CCSW as scheduled on 3/19/24 at 10AM, for support with completing housing application prior to my April 1st interview with [facility]. (Completed)  2. I will provide all necessary documentation and information to complete my interview on 4/1/24 with [facility]. (Completed)  3. I will monitor my incoming mail and answer my phone as I receive mail and calls from [facility] over the next 6-8 weeks.   4. I will follow up with [facility] in the next month regarding this goal.    Note: Patient completed 4/1 interview with BASIA Quinn.   Second Contact: MUSHTAQ Anna [phone]                                Action Plans on File:                       Advance Care Plans/Directives:   Advanced Care Plan/Directives on file:   No    Discussed with patient/caregiver(s): No data recorded           My Medical and Care Information  Problem List   Patient Active Problem List   Diagnosis    Vitamin D Deficiency    Major Depression, Single Episode With Psychotic Features    Congenital Spinal Deformity    Spinal cord injury at T1-T6 level without injury of  spinal bone (H)    Spinal deformity, congenital      Current Medications and Allergies:  See printed Medication Report.    Care Coordination Start Date: 3/12/2024   Frequency of Care Coordination: monthly, more frequently as needed     Form Last Updated: 06/24/2024

## 2024-06-24 NOTE — PROGRESS NOTES
Clinic Care Coordination Contact  Care Coordination Clinician Chart Review    Situation: Patient chart reviewed by Care Coordinator.       Background: Care Coordination Program started: 3/12/2024. Initial assessment completed and patient-centered care plan(s) were developed with participation from patient. Lead CC handed patient off to CHW for continued outreaches.       Assessment: Per chart review, patient outreach completed by CC CHW on 5/28.  Patient is actively working to accomplish goal(s). Patient's goal(s) appropriate and relevant at this time. Patient is due for updated Plan of Care.  Assessments will be completed annually or as needed/with change of patient status.      Care Plan: Community Resources       Problem: Public Housing Agency       Goal: I want to complete public housing application before my April 1st interview with [facility].       Start Date: 4/15/2024 Expected End Date: 10/15/2024    This Visit's Progress: 90% Recent Progress: 70%    Priority: High    Note:     Barriers: Language  Strengths: Accepting of support. Patient chosen on [facility] wait list.  Patient expressed understanding of goal: Yes    Action steps to achieve this goal:  1. I will meet with CCSW as scheduled on 3/19/24 at 10AM, for support with completing housing application prior to my April 1st interview with [facility]. (Completed)  2. I will provide all necessary documentation and information to complete my interview on 4/1/24 with [facility]. (Completed)  3. I will monitor my incoming mail and answer my phone as I receive mail and calls from [facility] over the next 6-8 weeks.   4. I will follow up with [facility] in the next month regarding this goal.    Note: Patient completed 4/1 interview with BASIA Quinn.   Second Contact: Cristel Hines, [place] [phone]                                   Plan/Recommendations: The patient will continue working with Care Coordination to achieve goal(s) as above. CHW will continue outreaches at minimum every 30 days and will  involve Lead CC as needed or if patient is ready to move to Maintenance. Lead CC will continue to monitor CHW outreaches and patient's progress to goal(s) every 6 weeks.     Plan of Care updated and sent to patient: Yes, via OSCAR Rocha, SW  Social Work Care Coordinator

## 2024-06-27 ENCOUNTER — PATIENT OUTREACH (OUTPATIENT)
Dept: CARE COORDINATION | Facility: CLINIC | Age: 34
End: 2024-06-27
Payer: COMMERCIAL

## 2024-06-27 ASSESSMENT — ACTIVITIES OF DAILY LIVING (ADL): DEPENDENT_IADLS:: INDEPENDENT

## 2024-06-27 NOTE — PROGRESS NOTES
Clinic Care Coordination Contact  Community Health Worker Follow Up  Spoke with patient - Tabby  ID Way    Care Gaps:     Health Maintenance Due   Topic Date Due    COVID-19 Vaccine (3 - 2023-24 season) 09/01/2023    PHQ-9  10/26/2023    DTAP/TDAP/TD IMMUNIZATION (5 - Td or Tdap) 12/02/2023     Postponed to next outreach.      Care Plan:   Care Plan: Community Resources       Problem: Public Housing Agency       Goal: I want to complete public housing application before my April 1st interview with [facility].       Start Date: 4/15/2024 Expected End Date: 10/15/2024    This Visit's Progress: 90% Recent Progress: 90%    Priority: High    Note:     Barriers: Language  Strengths: Accepting of support. Patient chosen on [facility] wait list.  Patient expressed understanding of goal: Yes    Action steps to achieve this goal:  1. I will meet with CCSW as scheduled on 3/19/24 at 10AM, for support with completing housing application prior to my April 1st interview with [facility]. (Completed)  2. I will provide all necessary documentation and information to complete my interview on 4/1/24 with [facility]. (Completed)  3. I will monitor my incoming mail and answer my phone as I receive mail and calls from [facility] over the next 6-8 weeks.   4. I will follow up with CCC in the next month regarding this goal.    Note: Patient completed 4/1 interview with BASIA Quinn.   Second Contact: Cristel Hines, [facility] [phone]                            Intervention and Education during outreach:   Per chart review, patient called on 6/24/24 regarding letter she received from [facility] requesting accommodations forms no later than 7/6/24. CHW outreach to CCSW and forms were initially faxed back on 6/4. CCSW mailed and faxed forms again on 6/24/24.     CHW reviewed above. Patient states, she's worried about due date and asked if CHW could help her call contact listed on letter she received, to follow up on accomodation forms. Patient gave contact for Deb  Bee, [employer]  with [facility] [phone].  CHW called Deb, left message informing her that accommodations forms were initially faxed on 6/4 and again by mail and fax on 6/24. Please return call if she has any concerns, thank you.   Patient will notify CC team if she needs any further assistance.    6/27/24 at 1:06PM: Deb Gamboa with [facility] returned call, left message stating she's returning call regarding [employer] Accomodation paperwork. She just checked with the assigned technician and she has not receive the paperwork yet. Deb will give her direct fax number and hoping it will going through, please fax to [phone]. If CHW wants to call back to confirmed she received it, please call [phone].   Consulted with CCSW via teams. CCSW will fax it again and follow up with Deb.     CHW Plan: CHW to follow up in 1 week. Routing to lead clinician ARAMW.     Leta Epperosn  Clinic Care Coordination  [facility]    Phone: [phone]

## 2024-07-02 ENCOUNTER — PATIENT OUTREACH (OUTPATIENT)
Dept: CARE COORDINATION | Facility: CLINIC | Age: 34
End: 2024-07-02
Payer: COMMERCIAL

## 2024-07-02 ASSESSMENT — ACTIVITIES OF DAILY LIVING (ADL): DEPENDENT_IADLS:: INDEPENDENT

## 2024-07-02 NOTE — PROGRESS NOTES
Clinic Care Coordination Contact  Community Health Worker Follow Up  Spoke with patient Megha Mcnair ID [id number]    Intervention and Education during outreach:   Patient called, states she received another call stating [facility] has not received accomodation forms. She's really worried because it's due on 7/6/24. CHW reassured patient that will follow up with CCSW for clarification. If needed CHW will inquire about extension of due date. CHW will follow up with patient in 1 business day.     Per consult with CCSW via teams. CCSW spoke with [facility] Boyd today and was given her email. CCSW sent accomodation forms to her email [email or web address] and fax line [phone] again today.     4:25PMPM: [facility] Boyd called to relay that she received the reasonable accomodation form that CCSW emailed her today.   CHW called zaira Mcnair  ID [id number], left message informing her of above. Please return call if any question.     CHW Plan: CHW to follow up in 1 month    Leta Epperson  Clinic Care Coordination  [facility]    Phone: [phone]

## 2024-07-05 ENCOUNTER — PATIENT OUTREACH (OUTPATIENT)
Dept: CARE COORDINATION | Facility: CLINIC | Age: 34
End: 2024-07-05
Payer: COMMERCIAL

## 2024-07-19 ENCOUNTER — PATIENT OUTREACH (OUTPATIENT)
Dept: CARE COORDINATION | Facility: CLINIC | Age: 34
End: 2024-07-19
Payer: COMMERCIAL

## 2024-08-02 ENCOUNTER — PATIENT OUTREACH (OUTPATIENT)
Dept: CARE COORDINATION | Facility: CLINIC | Age: 34
End: 2024-08-02
Payer: COMMERCIAL

## 2024-08-02 ASSESSMENT — ACTIVITIES OF DAILY LIVING (ADL): DEPENDENT_IADLS:: INDEPENDENT

## 2024-08-02 NOTE — PROGRESS NOTES
Clinic Care Coordination Contact  [facility]/Avelino Mcnair  ID [id number]    Clinical Data: Care Coordinator Outreach    Outreach Documentation Number of Outreach Attempt   8/2/2024   3:16 PM 1     Left message on patient's voicemail with call back information and requested return call.    Plan: Care Coordinator will try to reach patient again in 10 business days.    Leta Epperson  [facility] Care Coordination  [facility]    Phone: [phone]

## 2024-08-09 ENCOUNTER — PATIENT OUTREACH (OUTPATIENT)
Dept: CARE COORDINATION | Facility: CLINIC | Age: 34
End: 2024-08-09
Payer: COMMERCIAL

## 2024-08-09 NOTE — PROGRESS NOTES
Clinic Care Coordination Contact  Care Coordination Clinician Chart Review    Situation: Patient chart reviewed by Care Coordinator.       Background: Care Coordination Program started: 3/12/2024. Initial assessment completed and patient-centered care plan(s) were developed with participation from patient. Lead CC handed patient off to CHW for continued outreaches.       Assessment: Per chart review, patient outreach completed by CC CHW on 8/2.  Patient is actively working to accomplish goal(s). Patient's goal(s) appropriate and relevant at this time. Patient is not due for updated Plan of Care.  Assessments will be completed annually or as needed/with change of patient status.      Care Plan: Community Resources       Problem: Public Housing Agency       Goal: I want to complete public housing application before my April 1st interview with [facility].       Start Date: 4/15/2024 Expected End Date: 10/15/2024    This Visit's Progress: 90% Recent Progress: 90%    Priority: High    Note:     Barriers: Language  Strengths: Accepting of support. Patient chosen on [facility] wait list.  Patient expressed understanding of goal: Yes    Action steps to achieve this goal:  1. I will meet with CCSW as scheduled on 3/19/24 at 10AM, for support with completing housing application prior to my April 1st interview with [facility]. (Completed)  2. I will provide all necessary documentation and information to complete my interview on 4/1/24 with [facility]. (Completed)  3. I will monitor my incoming mail and answer my phone as I receive mail and calls from [facility] over the next 6-8 weeks.   4. I will follow up with [facility] in the next month regarding this goal.    Note: Patient completed 4/1 interview with BASIA Quinn.   Second Contact: Cristel Hines, [place] [phone]                                   Plan/Recommendations: The patient will continue working with Care Coordination to achieve goal(s) as above. CHW will continue outreaches at minimum every 30 days and  will involve Lead CC as needed or if patient is ready to move to Maintenance. Lead CC will continue to monitor CHW outreaches and patient's progress to goal(s) every 6 weeks.     Plan of Care updated and sent to patient: OSCAR Botello, [facility]  Social Work Care Coordinator

## 2024-08-20 ENCOUNTER — PATIENT OUTREACH (OUTPATIENT)
Dept: CARE COORDINATION | Facility: CLINIC | Age: 34
End: 2024-08-20
Payer: COMMERCIAL

## 2024-08-20 NOTE — PROGRESS NOTES
Clinic Care Coordination Contact  [facility]/Voicemail    Clinical Data: Care Coordinator Outreach    Outreach Documentation Number of Outreach Attempt   8/2/2024   3:16 PM 1   8/20/2024   2:23 PM 2       Tabby  left message on patient's voicemail with call back information and requested return call.    Plan:Care Coordinator will try to reach patient again in 10 business days.    KANA Ocasio   [phone]  Clinic Care Coordination  [facility]

## 2024-09-19 ENCOUNTER — PATIENT OUTREACH (OUTPATIENT)
Dept: CARE COORDINATION | Facility: CLINIC | Age: 34
End: 2024-09-19
Payer: COMMERCIAL

## 2024-09-19 ASSESSMENT — ACTIVITIES OF DAILY LIVING (ADL): DEPENDENT_IADLS:: INDEPENDENT

## 2024-09-19 NOTE — PROGRESS NOTES
Clinic Care Coordination Contact  Community Health Worker Follow Up  Spoke with patient - Tabby  ID Lay   Care Gaps:     Health Maintenance Due   Topic Date Due    PHQ-9  10/26/2023    DTAP/TDAP/TD IMMUNIZATION (5 - Td or Tdap) 12/02/2023    YEARLY PREVENTIVE VISIT  08/03/2024    INFLUENZA VACCINE (1) 09/01/2024    COVID-19 Vaccine (3 - 2024-25 season) 09/01/2024     Requested a  from Care Team to call patient. Patient is on her way to work and did not have time to make appointment during call.      Care Plan:   Care Plan: Community Resources Completed 9/19/2024      Problem: Public Housing Agency  Resolved 9/19/2024      Goal: I want to complete public housing application before my April 1st interview with [facility].  Completed 9/19/2024      Start Date: 4/15/2024 Expected End Date: 10/15/2024    This Visit's Progress: 90% Recent Progress: 90%    Priority: High    Note:     Barriers: Language  Strengths: Accepting of support. Patient chosen on [facility] wait list.  Patient expressed understanding of goal: Yes    Action steps to achieve this goal:  1. I will meet with CCSW as scheduled on 3/19/24 at 10AM, for support with completing housing application prior to my April 1st interview with [facility]. (Completed)  2. I will provide all necessary documentation and information to complete my interview on 4/1/24 with [facility]. (Completed)  3. I will monitor my incoming mail and answer my phone as I receive mail and calls from [facility] over the next 6-8 weeks.   4. I will follow up with [facility] in the next month regarding this goal.    Note: Patient completed 4/1 interview with BASIA Quinn.   Second Contact: Cristel Hines, [facility] [phone]                            Intervention and Education during outreach:   CHW inquired about update on [facility] housing wait list. Patient shares that she declined availability due to the location not being in area she wants. She was told her application was cancelled and she would need to reapply to  be added to wait list again. Patient does not plan to reapply at this time and will notify PCP or call CCC team if she changes her mind.  CHW inquired if patient needs any additional support or resources. Patient declined.      CHW Plan: Routing to lead clinician to review for maintenance or graduation. If maintenance is accepted, next outreach will be in 2 months    [facility] Care Coordination  [facility]    Phone: [phone]

## 2024-09-20 ENCOUNTER — PATIENT OUTREACH (OUTPATIENT)
Dept: CARE COORDINATION | Facility: CLINIC | Age: 34
End: 2024-09-20
Payer: COMMERCIAL

## 2024-09-20 ENCOUNTER — EXTERNAL ORDER RESULTS (OUTPATIENT)
Dept: FAMILY MEDICINE | Facility: CLINIC | Age: 34
End: 2024-09-20
Payer: COMMERCIAL

## 2024-09-20 DIAGNOSIS — B35.4 TINEA CORPORIS: ICD-10-CM

## 2024-09-20 RX ORDER — CLOTRIMAZOLE 1 %
CREAM (GRAM) TOPICAL 2 TIMES DAILY
Qty: 60 G | Refills: 3 | Status: SHIPPED | OUTPATIENT
Start: 2024-09-20

## 2024-09-20 NOTE — LETTER
[facility] CARE COORDINATION  [facility]  September 20, 2024    Kushal Vega Thoo  [address]   [place] [address]    Dear Kushal Vega,  Your Care Team congratulates you on your journey to maintain wellness. This document will help guide you on your journey to maintain a healthy lifestyle.  You can use this to help you overcome any barriers you may encounter.  If you should have any questions or concerns, you can contact the members of your Care Team or contact your Primary Care Clinic for assistance.     Health Maintenance  Health Maintenance Reviewed:      My Access Plan  Medical Emergency 911   Primary Clinic Line [facility] - [phone]   24 Hour Appointment Line [phone] or  [phone]) (toll-free)   24 Hour Nurse Line [phone] (toll-free)   Preferred Urgent Care     Preferred Hospital     Preferred Pharmacy [facility] - [place], [place] - [address]     Behavioral Health Crisis Line The National Suicide Prevention Lifeline at 1-471.873.4259 or 911     My Care Team Members  Patient Care Team         Relationship Specialty Notifications Start End    Isabel Mccall MD PCP - General Family Medicine  12/26/22     Merged    Phone: [phone] Fax: [phone]         [address] [place] [address]    Isabel Mccall MD Assigned PCP   8/13/22     Phone: [phone] Fax: [phone]         [address] [facility] [place] [address]    Leta Epperson CHW Community Health Worker Primary Care - CC Admissions 3/12/24 9/20/24    Phone: [phone]         Nely Castellanos LGSW Lead Care Coordinator  Admissions 3/12/24 9/20/24                  It has been your Clinic Care Team's pleasure to work with you on accomplishing your goals.    Regards,  Your Clinic Care Team

## 2024-09-20 NOTE — PROGRESS NOTES
Patient requested cream for itching/rash on hand. Only cream is lotrimin for fungal rash? Will send that but patient will need to be seen if not improving.     Isabel Mccall MD

## 2024-09-20 NOTE — PROGRESS NOTES
Clinic Care Coordination Contact    Assessment: Care Coordinator contacted patient for 2 month follow up.  Patient has continued to follow the plan of care and assessment is negative for any new needs or concerns.    Enrollment status: Graduated.      Plan: No further outreaches at this time.  Patient will continue to follow the plan of care.  If new needs arise a new Care Coordination referral may be placed.  FYI to PCP      OSCAR Barrientos, CAITLYN  Social Work Care Coordinator

## 2024-10-22 ENCOUNTER — PATIENT OUTREACH (OUTPATIENT)
Dept: CARE COORDINATION | Facility: CLINIC | Age: 34
End: 2024-10-22
Payer: COMMERCIAL

## 2025-07-03 ENCOUNTER — PRENATAL OFFICE VISIT (OUTPATIENT)
Dept: FAMILY MEDICINE | Facility: CLINIC | Age: 35
End: 2025-07-03
Payer: COMMERCIAL

## 2025-07-03 VITALS
BODY MASS INDEX: 28.08 KG/M2 | WEIGHT: 133.75 LBS | RESPIRATION RATE: 16 BRPM | DIASTOLIC BLOOD PRESSURE: 66 MMHG | HEIGHT: 58 IN | OXYGEN SATURATION: 99 % | HEART RATE: 81 BPM | TEMPERATURE: 98.4 F | SYSTOLIC BLOOD PRESSURE: 98 MMHG

## 2025-07-03 DIAGNOSIS — O21.9 NAUSEA AND VOMITING IN PREGNANCY: ICD-10-CM

## 2025-07-03 DIAGNOSIS — Z34.80 PRENATAL CARE, SUBSEQUENT PREGNANCY, UNSPECIFIED TRIMESTER: Primary | ICD-10-CM

## 2025-07-03 RX ORDER — PYRIDOXINE HCL (VITAMIN B6) 25 MG
TABLET ORAL
Qty: 60 TABLET | Refills: 2 | Status: SHIPPED | OUTPATIENT
Start: 2025-07-03

## 2025-07-03 RX ORDER — PNV NO.95/FERROUS FUM/FOLIC AC 28MG-0.8MG
1 TABLET ORAL DAILY
Qty: 30 CAPSULE | Refills: 12 | Status: SHIPPED | OUTPATIENT
Start: 2025-07-03

## 2025-07-03 ASSESSMENT — PATIENT HEALTH QUESTIONNAIRE - PHQ9
SUM OF ALL RESPONSES TO PHQ QUESTIONS 1-9: 12
10. IF YOU CHECKED OFF ANY PROBLEMS, HOW DIFFICULT HAVE THESE PROBLEMS MADE IT FOR YOU TO DO YOUR WORK, TAKE CARE OF THINGS AT HOME, OR GET ALONG WITH OTHER PEOPLE: NOT DIFFICULT AT ALL
SUM OF ALL RESPONSES TO PHQ QUESTIONS 1-9: 12

## 2025-07-03 NOTE — PROGRESS NOTES
"  SUBJECTIVE:     HPI:    This is a 34 year old female patient,  who presents for her first obstetrical visit.    ARLETH: 2026.  She is 7w5d weeks.  Her cycles are regular.  Her last menstrual period was normal. Since her LMP, she has experienced  nausea, emesis, fatigue, and headache).   She denies abdominal pain, loss of appetite, vaginal discharge, dysuria, pelvic pain, urinary urgency, lightheadedness, urinary frequency, vaginal bleeding, hemorrhoids, and constipation.    Additional History:   Patient, , is 7w5d today. She had a positive home pregnancy test a couple of weeks ago. She plans to deliver at [facility]. Her ultrasound is scheduled for  at [facility]. She does not express any concerns at time of visit.       HISTORY:   Planned Pregnancy: Yes  Marital Status: Single  Occupation: [employer]   Living in Household: Children Only and Other Relatives    Past History:  Her past medical history is non-contributory.      She has an OB history of 1 normal spontaneous vaginal delivery. 1  through [facility] back in 2019. Denies Hx of GDM, PreE, and/or labor & delivery complications.     Since her last LMP she denies use of alcohol, tobacco and street drugs.    Past medical, surgical, social and family history were reviewed and updated in EPIC.      OBJECTIVE:     EXAM:  BP 98/66 (BP Location: Left arm, Patient Position: Sitting, Cuff Size: Adult Regular)   Pulse 81   Temp 98.4  F (36.9  C) (Oral)   Resp 16   Ht 1.479 m (4' 10.23\")   Wt 60.7 kg (133 lb 12 oz)   LMP 05/10/2025 (Approximate)   SpO2 99%   BMI 27.74 kg/m   Body mass index is 27.74 kg/m .      ASSESSMENT/PLAN:       ICD-10-CM    1. Prenatal care, subsequent pregnancy, unspecified trimester  Z34.80 Prenatal MV-Min-Fe Fum-FA-DHA (PRENATAL MULTIVITAMIN PLUS DHA) 27-0.8-250 MG CAPS      OB < 14 weeks, single,  for dating (USJ173)     pyridOXINE (VITAMIN  B-6) 25 MG tablet      2. " Nausea and vomiting in pregnancy  O21.9 pyridOXINE (VITAMIN  B-6) 25 MG tablet          34 year old , 7w5d weeks of pregnancy with ARLETH of 2026, Alternate ARLETH Entry    Discussed as follows:     New Prenatal Education  during nurse intake    Medications- Prenatal Vitamin, recommend one with DHA as this helps with development of eyes and brain, no specific brand recommendation   Water Intake-  ounces daily   Weight- monitored at each appointment   Common Symptoms- may experience shortness of breath, increased heart rate, nausea/vomiting, headaches, cramping, spotting, etc.  If symptoms not improved and or worsening, contact provider.        - Only take Tylenol (acetaminophen) for headaches/pain concerns   - Review remedies & OTC medication to help with common symptoms in pregnancy (see taking medication during pregnancy handout)     [place]- baby is protected, not uncommon to have light cramping or spotting, reach out if persisting or worsening      Diet   - Wash fruits and vegetables before eating raw or cooking   - Avoid unpasteurized products   - Avoid undercooked meat, poultry, fish (no raw fish) and eggs    - Reheat hot dogs and luncheon meats/cold cuts prior to consumption     Caffeine- limit to 200 mg/day (about 1.5 cups of coffee)     Abstain from smoking, alcohol, and drugs      Travel     - No travel 4-6 weeks out from due date   - Planes/lengthy drives- get up and walk every 1-2 hours for circulation, increased risk for DVT during pregnancy   - Seat belts- wear underneath belly not across it   - Air bags- back up seat      Disease and Infection     Risk of toxoplasmosis with cats  feces, have someone else change litter box during pregnancy, wear gloves when working outside and wash hands thoroughly   Avoid traveling to locations high risk for zika, use insect repellent, wear long sleeves and pants   Flu vaccine during flu season, COVID vaccine and TDAP   TDAP recommended with each  pregnancy, make sure partner is up to date as well (usually only once every 10 years)      Frequency of Appointments- unless advised otherwise   Every 4 weeks until 28 weeks   Every 2 weeks until 36 weeks   Weekly until delivery           Prenatal OB Questionnaire    Patient supplied answers from flow sheet for:  Prenatal OB Questionnaire.  Past Medical History  Have you ever recieved care for your mental health? : (Patient-Rptd) No  Have you ever been in a major accident or suffered serious trauma?: (Patient-Rptd) No  Within the last year, has anyone hit, slapped, kicked or otherwise hurt you?: (Patient-Rptd) No  In the last year, has anyone forced you to have sex when you didn't want to?: (Patient-Rptd) No    Past Medical History 2   Have you ever received a blood transfusion?: (Patient-Rptd) No  Would you accept a blood transfusion if was medically recommended?: (Patient-Rptd) Yes  Does anyone in your home smoke?: (Patient-Rptd) No   Is your blood type Rh negative?: (Patient-Rptd) Unknown  Have you ever ?: (Patient-Rptd) No  Have you been hospitalized for a nonsurgical reason excluding normal delivery?: (Patient-Rptd) No  Have you ever had an abnormal pap smear?: (Patient-Rptd) No    Past Medical History (Continued)  Do you have a history of abnormalities of the uterus?: (Patient-Rptd) No  Did your mother take RANDA or any other hormones when she was pregnant with you?: (Patient-Rptd) Unknown  Do you have any other problems we have not asked about which you feel may be important to this pregnancy?: (Patient-Rptd) No      Answers submitted by the patient for this visit:  Patient Health Questionnaire (Submitted on 7/3/2025)  If you checked off any problems, how difficult have these problems made it for you to do your work, take care of things at home, or get along with other people?: Not difficult at all  PHQ9 TOTAL SCORE: 12         Allergies as of 7/3/2025:    Allergies as of 07/03/2025    (No Known  Allergies)       Current medications are:  Current Outpatient Medications   Medication Sig Dispense Refill    Prenatal MV-Min-Fe Fum-FA-DHA (PRENATAL MULTIVITAMIN PLUS DHA) 27-0.8-250 MG CAPS Take 1 tablet by mouth daily. 30 capsule 12    pyridOXINE (VITAMIN  B-6) 25 MG tablet 1 tablet every 6-8 hours 60 tablet 2    clotrimazole (LOTRIMIN) 1 % external cream Apply topically 2 times daily. Rash on hand 60 g 3           Nakul Kauffman, MSN, RN   [facility]

## 2025-07-03 NOTE — LETTER
July 3, 2025      Kushal Sommers  [address]   [place] [address]        To Whom It May Concern:      Kushal Sommers was seen in our clinic today. Please excuse her from work today.               Sincerely,                Nakul Kauffman, MSN, RN   [facility]

## 2025-07-07 ENCOUNTER — HOSPITAL ENCOUNTER (OUTPATIENT)
Dept: ULTRASOUND IMAGING | Facility: HOSPITAL | Age: 35
Discharge: HOME | End: 2025-07-07
Attending: FAMILY MEDICINE | Admitting: FAMILY MEDICINE
Payer: COMMERCIAL

## 2025-07-07 DIAGNOSIS — Z34.80 PRENATAL CARE, SUBSEQUENT PREGNANCY, UNSPECIFIED TRIMESTER: ICD-10-CM

## 2025-07-07 PROCEDURE — 76801 OB US < 14 WKS SINGLE FETUS: CPT

## 2025-07-08 ENCOUNTER — TELEPHONE (OUTPATIENT)
Dept: FAMILY MEDICINE | Facility: CLINIC | Age: 35
End: 2025-07-08
Payer: COMMERCIAL

## 2025-07-08 NOTE — TELEPHONE ENCOUNTER
Called patient and rescheduled patient to see Dr. Dick.     Future Appointments 7/8/2025 - 1/4/2026        Date Visit Type Length Department Provider     8/13/2025  3:30 PM FIRST PROVIDER OB VISIT 30 min [facility]/OB Shivani Dick MD    Location Instructions:     [facility] is in [address] [facility] at [address] [place] [address] in [place]. From [address], exit to turn east on [address], then turn north on [address]. Free parking is available; follow the signs in the lot to drive around the [facility] s [address] building to the front of the [address] building, which faces the [facility].                        Nakul Kauffman, MSN, RN   [facility]

## 2025-07-08 NOTE — TELEPHONE ENCOUNTER
This patient is scheduled to see me for OB care.  However, her child sees Dr iDck and it looks like she used to see Dr Dick.  I recommend that she see Dr Dick for current pregnancy instead, unless she is not available.  Please help switch her First Provider Visit.

## 2025-08-12 LAB
ABO + RH BLD: NORMAL
BLD GP AB SCN SERPL QL: NEGATIVE
SPECIMEN EXP DATE BLD: NORMAL

## 2025-08-13 ENCOUNTER — VIRTUAL VISIT (OUTPATIENT)
Dept: INTERPRETER SERVICES | Facility: CLINIC | Age: 35
End: 2025-08-13

## 2025-08-13 ENCOUNTER — PRENATAL OFFICE VISIT (OUTPATIENT)
Dept: FAMILY MEDICINE | Facility: CLINIC | Age: 35
End: 2025-08-13
Payer: COMMERCIAL

## 2025-08-13 VITALS
HEIGHT: 58 IN | DIASTOLIC BLOOD PRESSURE: 62 MMHG | SYSTOLIC BLOOD PRESSURE: 90 MMHG | BODY MASS INDEX: 28.6 KG/M2 | OXYGEN SATURATION: 98 % | WEIGHT: 136.25 LBS | HEART RATE: 89 BPM | RESPIRATION RATE: 16 BRPM | TEMPERATURE: 98.5 F

## 2025-08-13 DIAGNOSIS — Z34.90 PREGNANCY, UNSPECIFIED GESTATIONAL AGE: Primary | ICD-10-CM

## 2025-08-13 LAB
ERYTHROCYTE [DISTWIDTH] IN BLOOD BY AUTOMATED COUNT: 13.1 % (ref 10–15)
EST. AVERAGE GLUCOSE BLD GHB EST-MCNC: 103 MG/DL
HBA1C MFR BLD: 5.2 % (ref 0–5.6)
HCT VFR BLD AUTO: 33.2 % (ref 35–47)
HGB BLD-MCNC: 11.3 G/DL (ref 11.7–15.7)
MCH RBC QN AUTO: 30.4 PG (ref 26.5–33)
MCHC RBC AUTO-ENTMCNC: 34 G/DL (ref 31.5–36.5)
MCV RBC AUTO: 89.2 FL (ref 78–100)
PLATELET # BLD AUTO: 245 10E3/UL (ref 150–450)
RBC # BLD AUTO: 3.72 10E6/UL (ref 3.8–5.2)
WBC # BLD AUTO: 9.14 10E3/UL (ref 4–11)

## 2025-08-13 PROCEDURE — 86780 TREPONEMA PALLIDUM: CPT | Performed by: FAMILY MEDICINE

## 2025-08-13 PROCEDURE — 86850 RBC ANTIBODY SCREEN: CPT | Performed by: FAMILY MEDICINE

## 2025-08-13 PROCEDURE — 83036 HEMOGLOBIN GLYCOSYLATED A1C: CPT | Performed by: FAMILY MEDICINE

## 2025-08-13 PROCEDURE — 83655 ASSAY OF LEAD: CPT | Mod: 90 | Performed by: FAMILY MEDICINE

## 2025-08-13 PROCEDURE — 36415 COLL VENOUS BLD VENIPUNCTURE: CPT | Performed by: FAMILY MEDICINE

## 2025-08-13 PROCEDURE — 85027 COMPLETE CBC AUTOMATED: CPT | Performed by: FAMILY MEDICINE

## 2025-08-13 PROCEDURE — T1013 SIGN LANG/ORAL INTERPRETER: HCPCS | Mod: U4

## 2025-08-13 PROCEDURE — 86803 HEPATITIS C AB TEST: CPT | Performed by: FAMILY MEDICINE

## 2025-08-13 PROCEDURE — 86900 BLOOD TYPING SEROLOGIC ABO: CPT | Performed by: FAMILY MEDICINE

## 2025-08-13 PROCEDURE — 87491 CHLMYD TRACH DNA AMP PROBE: CPT | Performed by: FAMILY MEDICINE

## 2025-08-13 PROCEDURE — 87591 N.GONORRHOEAE DNA AMP PROB: CPT | Performed by: FAMILY MEDICINE

## 2025-08-13 PROCEDURE — 86901 BLOOD TYPING SEROLOGIC RH(D): CPT | Performed by: FAMILY MEDICINE

## 2025-08-13 PROCEDURE — 99000 SPECIMEN HANDLING OFFICE-LAB: CPT | Performed by: FAMILY MEDICINE

## 2025-08-13 PROCEDURE — 87389 HIV-1 AG W/HIV-1&-2 AB AG IA: CPT | Performed by: FAMILY MEDICINE

## 2025-08-13 PROCEDURE — 87340 HEPATITIS B SURFACE AG IA: CPT | Performed by: FAMILY MEDICINE

## 2025-08-14 LAB
C TRACH DNA SPEC QL NAA+PROBE: NEGATIVE
HBV SURFACE AG SERPL QL IA: NONREACTIVE
HCV AB SERPL QL IA: NONREACTIVE
HIV 1+2 AB+HIV1 P24 AG SERPL QL IA: NONREACTIVE
N GONORRHOEA DNA SPEC QL NAA+PROBE: NEGATIVE
SPECIMEN TYPE: NORMAL
SPECIMEN TYPE: NORMAL
T PALLIDUM AB SER QL: NONREACTIVE